# Patient Record
Sex: MALE | HISPANIC OR LATINO | URBAN - METROPOLITAN AREA
[De-identification: names, ages, dates, MRNs, and addresses within clinical notes are randomized per-mention and may not be internally consistent; named-entity substitution may affect disease eponyms.]

---

## 2017-01-24 ENCOUNTER — OFFICE VISIT (OUTPATIENT)
Dept: FAMILY MEDICINE | Facility: CLINIC | Age: 52
End: 2017-01-24
Payer: COMMERCIAL

## 2017-01-24 VITALS
TEMPERATURE: 98.4 F | HEART RATE: 64 BPM | DIASTOLIC BLOOD PRESSURE: 74 MMHG | WEIGHT: 151 LBS | HEIGHT: 64 IN | SYSTOLIC BLOOD PRESSURE: 133 MMHG | BODY MASS INDEX: 25.78 KG/M2

## 2017-01-24 DIAGNOSIS — R07.0 THROAT DISCOMFORT: ICD-10-CM

## 2017-01-24 DIAGNOSIS — J01.90 ACUTE SINUSITIS WITH SYMPTOMS > 10 DAYS: ICD-10-CM

## 2017-01-24 DIAGNOSIS — H69.90 DYSFUNCTION OF EUSTACHIAN TUBE, UNSPECIFIED LATERALITY: Primary | ICD-10-CM

## 2017-01-24 DIAGNOSIS — Z79.4 TYPE 2 DIABETES MELLITUS WITHOUT COMPLICATION, WITH LONG-TERM CURRENT USE OF INSULIN (H): ICD-10-CM

## 2017-01-24 DIAGNOSIS — E11.9 TYPE 2 DIABETES MELLITUS WITHOUT COMPLICATION, WITH LONG-TERM CURRENT USE OF INSULIN (H): ICD-10-CM

## 2017-01-24 PROCEDURE — 99213 OFFICE O/P EST LOW 20 MIN: CPT | Performed by: FAMILY MEDICINE

## 2017-01-24 RX ORDER — PSEUDOEPHEDRINE HCL 120 MG/1
120 TABLET, FILM COATED, EXTENDED RELEASE ORAL EVERY 12 HOURS PRN
Qty: 28 TABLET | Refills: 1 | Status: SHIPPED | OUTPATIENT
Start: 2017-01-24 | End: 2017-12-14

## 2017-01-24 RX ORDER — AMOXICILLIN 500 MG/1
500 CAPSULE ORAL 3 TIMES DAILY
Qty: 42 CAPSULE | Refills: 1 | Status: SHIPPED | OUTPATIENT
Start: 2017-01-24 | End: 2017-02-07

## 2017-01-24 ASSESSMENT — PAIN SCALES - GENERAL: PAINLEVEL: NO PAIN (0)

## 2017-01-24 NOTE — MR AVS SNAPSHOT
After Visit Summary   1/24/2017    Alfred Koenig    MRN: [id number]           Patient Information     Date Of Birth          1965        Visit Information        Provider Department      1/24/2017 9:40 AM Davon Little MD [facility]        Today's Diagnoses     Dysfunction of eustachian tube, unspecified laterality    -  1     Acute sinusitis with symptoms > 10 days         Throat discomfort           Care Instructions    Stay well hydrated    Use the pseudoephedrine as needed, up to 2x per day.  May raise blood pressure some or make for sleep problems    Take the antibiotic amoxicillin    If symptoms not resolving, then see ear nose throat doctor (ENT)            Follow-ups after your visit        Additional Services     OTOLARYNGOLOGY REFERRAL       [place] [facility] [place] [facility] [place] [phone]   [email or web address]    Please be aware that coverage of these services is subject to the terms and limitations of your health insurance plan.  Call member services at your health plan with any benefit or coverage questions.      Please bring the following with you to your appointment:    (1) Any X-Rays, CTs or MRIs which have been performed.  Contact the facility where they were done to arrange for  prior to your scheduled appointment.   (2) List of current medications  (3) This referral request   (4) Any documents/labs given to you for this referral                  Who to contact     If you have questions or need follow up information about today's clinic visit or your schedule please contact [facility] directly at [phone].  Normal or non-critical lab and imaging results will be communicated to you by MyChart, letter or phone within 4 business days after the clinic has received the results. If you do not hear from us within 7 days, please contact the clinic through [employer] or  "phone. If you have a critical or abnormal lab result, we will notify you by phone as soon as possible.  Submit refill requests through [employer] or call your pharmacy and they will forward the refill request to us. Please allow 3 business days for your refill to be completed.          Additional Information About Your Visit        [employer] Information     [employer] lets you send messages to your doctor, view your test results, renew your prescriptions, schedule appointments and more. To sign up, go to [email or web address] . Click on \"Log in\" on the left side of the screen, which will take you to the Welcome page. Then click on \"Sign up Now\" on the right side of the page.     You will be asked to enter the access code listed below, as well as some personal information. Please follow the directions to create your username and password.     Your access code is: [email or web address]  Expires: 3/6/2017  8:06 AM     Your access code will  in 90 days. If you need help or a new code, please call your [facility] clinic or [phone].        Care EveryWhere ID     This is your Care EveryWhere ID. This could be used by other organizations to access your [facility] medical records  [id number]        Your Vitals Were     Pulse Temperature Height BMI (Body Mass Index)          64 98.4  F (36.9  C) (Oral) 5' 4\" (1.626 m) 25.91 kg/m2         Blood Pressure from Last 3 Encounters:   17 133/74   16 130/72   16 125/78    Weight from Last 3 Encounters:   17 151 lb (68.493 kg)   16 148 lb (67.132 kg)   16 148 lb (67.132 kg)              We Performed the Following     OTOLARYNGOLOGY REFERRAL          Today's Medication Changes          These changes are accurate as of: 17 10:05 AM.  If you have any questions, ask your nurse or doctor.               Start taking these medicines.        Dose/Directions    amoxicillin 500 MG capsule   Commonly known as:  AMOXIL   Used for:  Acute sinusitis with symptoms " > 10 days   Started by:  Davon Little MD        Dose:  500 mg   Take 1 capsule (500 mg) by mouth 3 times daily for 14 days   Quantity:  42 capsule   Refills:  1       pseudoePHEDrine 120 MG 12 hr tablet   Commonly known as:  SUDAFED   Used for:  Dysfunction of eustachian tube, unspecified laterality   Started by:  Davon Little MD        Dose:  120 mg   Take 1 tablet (120 mg) by mouth every 12 hours as needed for congestion   Quantity:  28 tablet   Refills:  1            Where to get your medicines      Some of these will need a paper prescription and others can be bought over the counter.  Ask your nurse if you have questions.     Bring a paper prescription for each of these medications    - amoxicillin 500 MG capsule  - pseudoePHEDrine 120 MG 12 hr tablet             Primary Care Provider Office Phone # Fax #    Davon Little [phone]       [facility] [address] [facility] [address]        Thank you!     Thank you for choosing [facility]  for your care. Our goal is always to provide you with excellent care. Hearing back from our patients is one way we can continue to improve our services. Please take a few minutes to complete the written survey that you may receive in the mail after your visit with us. Thank you!             Your Updated Medication List - Protect others around you: Learn how to safely use, store and throw away your medicines at www.disposemymeds.org.          This list is accurate as of: 1/24/17 10:05 AM.  Always use your most recent med list.                   Brand Name Dispense Instructions for use    ACE/ARB NOT PRESCRIBED (INTENTIONAL)      1 each continuous prn. ACE & ARB not prescribed due to Other: blood pressure fine, no microalbuminuria       amoxicillin 500 MG capsule    AMOXIL    42 capsule    Take 1 capsule (500 mg) by mouth 3 times daily for 14 days       aspirin 81 MG tablet     100 Tab    ONE DAILY        blood glucose monitoring lancets     1 Box    1 each 3 times daily       blood glucose monitoring meter device kit    no brand specified    1 kit    1 Device 3 times daily.       * blood glucose monitoring test strip    no brand specified    3 Month    Use to test blood sugar 3 times daily (Contour NEXT Test Strips).       * blood glucose monitoring test strip    SHRADDHA CONTOUR NEXT    100 each    Please note needs Contour NEXT  test stripsby In Vitro route 3 times daily. Please note needs Contour NEXT  test strips       glipiZIDE 10 MG tablet    GLUCOTROL    180 tablet    Take 1 tablet (10 mg) by mouth 2 times daily (before meals)       insulin glargine 100 UNIT/ML injection    LANTUS    3 Month    50 units at bedtime       insulin pen needle 31G X 8 MM     100 each    1 Device daily       metFORMIN 1000 MG tablet    GLUCOPHAGE    180 tablet    Take 1 tablet (1,000 mg) by mouth 2 times daily (with meals)       pseudoePHEDrine 120 MG 12 hr tablet    SUDAFED    28 tablet    Take 1 tablet (120 mg) by mouth every 12 hours as needed for congestion       rosuvastatin 40 MG tablet    CRESTOR    90 tablet    Take 1 tablet (40 mg) by mouth daily       sildenafil 100 MG cap/tab    VIAGRA    6 tablet    Take 0.5-1 tablets ( mg) by mouth daily as needed for erectile dysfunction Take 30 min to 4 hours before intercourse.  Never use with nitroglycerin, terazosin or doxazosin.       * Notice:  This list has 2 medication(s) that are the same as other medications prescribed for you. Read the directions carefully, and ask your doctor or other care provider to review them with you.

## 2017-01-24 NOTE — PROGRESS NOTES
SUBJECTIVE:                                                    Alfred Koenig is a 51 year old male who presents to clinic today for the following health issues:       Throat problems on and off for the last 2 months feels like something is stuck in there  Right ear problem also         Problem list and histories reviewed & adjusted, as indicated.  Additional history: as documented             HPI      ROS  No pain    Happens several times per day; variable    Eating and drinking okay    Nothing gets stuck     No acid taste    Ear bothering also    Also a couple months     Overall no change    No fever/ chills    Fine in am when waking up    Feels some drainage down back of throat      Physical Exam   Constitutional: He is oriented to person, place, and time and well-developed, well-nourished, and in no distress.   HENT:   Head: Normocephalic and atraumatic.   Right Ear: External ear normal.   Left Ear: External ear normal.   Nose: Nose normal.   Mouth/Throat: Oropharynx is clear and moist.   No sinus/ submandib tenderness   Eyes: Conjunctivae are normal.   Neck: Neck supple.   Cardiovascular: Normal rate, regular rhythm and normal heart sounds.    Pulmonary/Chest: Effort normal and breath sounds normal. He exhibits no tenderness.   Abdominal: Soft. Bowel sounds are normal. There is no tenderness.   No back or costovertebral angle tenderness     Neurological: He is alert and oriented to person, place, and time.       ASSESSMENT / PLAN:  (H69.80) Dysfunction of eustachian tube, unspecified laterality  (primary encounter diagnosis)  Comment: prudent to try sudafed prn.  Warned of side effects.   Plan: pseudoePHEDrine (SUDAFED) 120 MG 12 hr tablet             (J01.90) Acute sinusitis with symptoms > 10 days  Comment: gave prescription to treat for smoldering posterior sinusitis  Plan: amoxicillin (AMOXIL) 500 MG capsule             (R07.0) Throat discomfort  Comment: if patient goes through the above, and symptoms  not better, then see ENT  Plan: OTOLARYNGOLOGY REFERRAL        Patient agreed with plan     (E11.9,  Z79.4) Type 2 diabetes mellitus without complication, with long-term current use of insulin (H)  Comment: patient also needs new meter   Plan: blood glucose monitoring (NO BRAND SPECIFIED)         meter device kit        Sent in       I reviewed the patient's medications, allergies, medical history, family history, and social history.    Davon Little MD

## 2017-01-24 NOTE — PATIENT INSTRUCTIONS
Stay well hydrated    Use the pseudoephedrine as needed, up to 2x per day.  May raise blood pressure some or make for sleep problems    Take the antibiotic amoxicillin    If symptoms not resolving, then see ear nose throat doctor (ENT)

## 2017-01-24 NOTE — NURSING NOTE
"Chief Complaint   Patient presents with     Throat Problem       Initial /74 mmHg  Pulse 64  Temp(Src) 98.4  F (36.9  C) (Oral)  Ht 5' 4\" (1.626 m)  Wt 151 lb (68.493 kg)  BMI 25.91 kg/m2 Estimated body mass index is 25.91 kg/(m^2) as calculated from the following:    Height as of this encounter: 5' 4\" (1.626 m).    Weight as of this encounter: 151 lb (68.493 kg).  BP completed using cuff size: regular taken on the right arm  Erica Broderick CMA      "

## 2017-02-13 DIAGNOSIS — Z79.4 TYPE 2 DIABETES MELLITUS WITHOUT COMPLICATION, WITH LONG-TERM CURRENT USE OF INSULIN (H): ICD-10-CM

## 2017-02-13 DIAGNOSIS — E11.9 TYPE 2 DIABETES MELLITUS WITHOUT COMPLICATION, WITH LONG-TERM CURRENT USE OF INSULIN (H): ICD-10-CM

## 2017-02-13 NOTE — TELEPHONE ENCOUNTER
glipiZIDE (GLUCOTROL) 10 MG tablet         Last Written Prescription Date: 12-6-16  Last Fill Quantity: 180, # refills: 1  Last Office Visit with [facility], [facility] or [facility] prescribing provider:  1-24-17        BP Readings from Last 3 Encounters:   01/24/17 133/74   12/06/16 130/72   03/23/16 125/78     Lab Results   Component Value Date    MICROL <5 03/23/2016     No results found for: MICROALBUMIN  Creatinine   Date Value Ref Range Status   12/06/2016 0.83 0.66 - 1.25 mg/dL Final   ]  GFR Estimate   Date Value Ref Range Status   12/06/2016 >90  Non  GFR Calc   >60 mL/min/1.7m2 Final   03/23/2016 >90  Non  GFR Calc   >60 mL/min/1.7m2 Final   04/14/2015 >90  Non  GFR Calc   >60 mL/min/1.7m2 Final     GFR Estimate If Black   Date Value Ref Range Status   12/06/2016 >90   GFR Calc   >60 mL/min/1.7m2 Final   03/23/2016 >90   GFR Calc   >60 mL/min/1.7m2 Final   04/14/2015 >90   GFR Calc   >60 mL/min/1.7m2 Final     Lab Results   Component Value Date    CHOL 191 12/06/2016     Lab Results   Component Value Date    HDL 58 12/06/2016     Lab Results   Component Value Date     12/06/2016     Lab Results   Component Value Date    TRIG 103 12/06/2016     Lab Results   Component Value Date    CHOLHDLRATIO 3.5 04/14/2015     Lab Results   Component Value Date    AST 17 12/06/2016     Lab Results   Component Value Date    ALT 24 12/06/2016     Lab Results   Component Value Date    A1C 8.3 12/06/2016    A1C 7.9 03/23/2016    A1C 8.1 04/14/2015    A1C 7.7 04/09/2014    A1C 8.2 12/18/2013     Potassium   Date Value Ref Range Status   12/06/2016 4.4 3.4 - 5.3 mmol/L Final

## 2017-02-14 RX ORDER — GLIPIZIDE 10 MG/1
10 TABLET ORAL
Qty: 180 TABLET | Refills: 1 | Status: SHIPPED | OUTPATIENT
Start: 2017-02-14 | End: 2017-12-14

## 2017-02-22 ENCOUNTER — TELEPHONE (OUTPATIENT)
Dept: FAMILY MEDICINE | Facility: CLINIC | Age: 52
End: 2017-02-22

## 2017-02-22 NOTE — TELEPHONE ENCOUNTER
Patient on fail list for diabetes.  Should be seen in clinic in the next month or so for clinic visit.    Please inform patient.    Davon Little MD

## 2017-02-28 ENCOUNTER — OFFICE VISIT (OUTPATIENT)
Dept: OTOLARYNGOLOGY | Facility: CLINIC | Age: 52
End: 2017-02-28
Payer: COMMERCIAL

## 2017-02-28 DIAGNOSIS — K21.9 LPRD (LARYNGOPHARYNGEAL REFLUX DISEASE): ICD-10-CM

## 2017-02-28 DIAGNOSIS — R09.A2 GLOBUS SENSATION: Primary | ICD-10-CM

## 2017-02-28 PROCEDURE — 99244 OFF/OP CNSLTJ NEW/EST MOD 40: CPT | Mod: 25 | Performed by: OTOLARYNGOLOGY

## 2017-02-28 PROCEDURE — 31575 DIAGNOSTIC LARYNGOSCOPY: CPT | Performed by: OTOLARYNGOLOGY

## 2017-02-28 NOTE — PROGRESS NOTES
I am seeing this patient in consultation for globus at the request of the provider Dr. Little.    Chief Complaint - foreign body sensation in throat    History of Present Illness - Alfred Koenig is a 51 year old male who presents with a history of feeling like something is in the back of the throat since 3 months. It is intermittent. No pain. Voicing has seemed normal. The patient denies any recent intubations or throat procedures. They note some history of relux. Sometimes takes tums. No cough, hemoptysis, odynaphagia. No dysphagia with solids. Tried antibiotics, but no help. Non smoker.     Past Medical History -   Patient Active Problem List   Diagnosis     Hyperlipidemia LDL goal <100     Type 2 diabetes, HbA1C goal < 8% (H)     Erectile dysfunction, unspecified erectile dysfunction type     Type 2 diabetes mellitus without complication, with long-term current use of insulin (H)       Current Medications -   Current Outpatient Prescriptions:      glipiZIDE (GLUCOTROL) 10 MG tablet, Take 1 tablet (10 mg) by mouth 2 times daily (before meals), Disp: 180 tablet, Rfl: 1     pseudoePHEDrine (SUDAFED) 120 MG 12 hr tablet, Take 1 tablet (120 mg) by mouth every 12 hours as needed for congestion, Disp: 28 tablet, Rfl: 1     blood glucose monitoring (NO BRAND SPECIFIED) meter device kit, Use to test blood sugar 3 times daily or as directed.  Needs new meter and then also include all  Needed strips, lancets, and other supplies, 3 months worth, refills for a year, Disp: 1 kit, Rfl: 11     sildenafil (VIAGRA) 100 MG cap/tab, Take 0.5-1 tablets ( mg) by mouth daily as needed for erectile dysfunction Take 30 min to 4 hours before intercourse.  Never use with nitroglycerin, terazosin or doxazosin., Disp: 6 tablet, Rfl: 1     metFORMIN (GLUCOPHAGE) 1000 MG tablet, Take 1 tablet (1,000 mg) by mouth 2 times daily (with meals), Disp: 180 tablet, Rfl: 1     insulin glargine (LANTUS) 100 UNIT/ML injection, 50 units at bedtime,  Disp: 3 Month, Rfl: 1     blood glucose monitoring (SHRADDHA MICROLET) lancets, 1 each 3 times daily, Disp: 1 Box, Rfl: 11     blood glucose monitoring (SHRADDHA CONTOUR NEXT) test strip, Please note needs Contour NEXT  test stripsby In Vitro route 3 times daily. Please note needs Contour NEXT  test strips, Disp: 100 each, Rfl: 11     rosuvastatin (CRESTOR) 40 MG tablet, Take 1 tablet (40 mg) by mouth daily, Disp: 90 tablet, Rfl: 3     blood glucose monitoring (NO BRAND SPECIFIED) test strip, Use to test blood sugar 3 times daily (Contour NEXT Test Strips)., Disp: 3 Month, Rfl: 3     insulin pen needle 31G X 8 MM, 1 Device daily, Disp: 100 each, Rfl: 3     ACE/ARB NOT PRESCRIBED, INTENTIONAL,, 1 each continuous prn. ACE & ARB not prescribed due to Other: blood pressure fine, no microalbuminuria   , Disp: , Rfl:      ASPIRIN 81 MG PO TABS, ONE DAILY, Disp: 100 Tab, Rfl: 3    Allergies - No Known Allergies    Social History -   Social History     Social History     Marital status:      Spouse name: N/A     Number of children: 5     Years of education: N/A     Occupational History      [employer]     Social History Main Topics     Smoking status: Never Smoker     Smokeless tobacco: Never Used     Alcohol use 0.0 oz/week     0 Standard drinks or equivalent per week      Comment: 6 beers per week     Drug use: No     Sexual activity: Yes     Partners: Female     Other Topics Concern     Parent/Sibling W/ Cabg, Mi Or Angioplasty Before 65f 55m? No     Social History Narrative       Family History -   Family History   Problem Relation Age of Onset     DIABETES Mother      DIABETES Sister      DIABETES Brother      DIABETES Son      C.A.D. No family hx of      CANCER No family hx of      Hypertension No family hx of      CEREBROVASCULAR DISEASE No family hx of      Hearing Loss No family hx of      Thyroid Disease No family hx of      Glaucoma No family hx of      Macular Degeneration No family hx of         Review of Systems - As per HPI and PMHx, notes some allergies and postnasal drainage that is mild, otherwise 10+ comprehensive system review is negative.    Physical Exam  General - The patient is in no distress. Alert and oriented to person and place, answers questions and cooperates with examination appropriately.   Voice and Breathing - The patient was breathing comfortably without the use of accessory muscles. There was no wheezing, stridor, or stertor.  The patients voice was clear and strong.  Eyes - Extraocular movements intact.  Sclera were not icteric or injected, conjunctiva were pink and moist.  Mouth - Examination of the oral cavity showed pink, healthy oral mucosa. No lesions or ulcerations noted.  The tongue was mobile and midline.  Throat - The walls of the oropharynx were smooth, symmetric, and had no lesions or ulcerations.  The tonsillar pillars and soft palate were symmetric.  The uvula was midline on elevation.   Nose - External contour is symmetric, no gross deflection or scars.  Nasal mucosa is pink and moist with no abnormal mucus.  The septum was midline and non-obstructive, turbinates of normal size and position.  No polyps, masses, or purulence noted on examination.  Neck -  Palpation of the occipital, submental, submandibular, internal jugular chain, and supraclavicular nodes did not demonstrate any abnormal lymph nodes or masses. No parotid masses. Palpation of the thyroid was soft and smooth, with no nodules or goiter appreciated.  The trachea was mobile and midline.  Neurologic - CN II-XII are grossly intact, no focal neurologic deficits.   Cardiovascular - carotid pulses are 2+ bilaterally, regular rhythm    Procedure: Flexible Endoscopy  Indication: Globus Sensation    To best visualize the upper airway anatomy and due to the chief complaint and HPI, I proceeded with a fiberoptic examination. Color photographs were taken for the medical record. First I sprayed the right side of  the nose with a mixture of lidocaine and neosynephrine.  I then passed the scope through the nasal cavity.  The nasal cavity was unremarkable.  The nasopharynx was mucosally covered and symmetric.  The Eustachian tube openings were unobstructed.  Going further down I had a clear view of the base of tongue which had normal appearing lingual tonsillar tissue.  The base of tongue was free of lesions, masses, and the vallecula was open.  The epiglottis was smooth and mucosally covered.  The supraglottic larynx was then clearly visualized. It appeared normal. Maybe mild inflammation of the arytenoids. The vocal cords moved smoothly and symmetrically, they were pearly white and no lesions were seen.  The pyriform sinuses were open, and the limited view of the postcricoid region did not show any lesions.      BONITA/FRANCISCO Koenig is a 51 year old male with globus sensation but no evidence of infection, inflammation, or neoplasm on exam to explain the symptoms. I think the most likely etiology is laryngopharyngeal reflux. I have advised trying pepcid, and provided counseling on lifestyle changes including avoidance of certain foods, sleeping on an incline, and avoiding lying down within 3-4 hours after eating. Recheck in 4-6 weeks or if things worsen.     Adult lifestyle changes to prevent LPR reviewed      Avoid eating and drinking within two to three hours prior to bedtime    Do not drink alcohol    Eat small meals and slowly    Limit problem foods:    o Caffeine  o Carbonated drinks  o Chocolate  o Peppermint  o Tomato  o Citrus fruits  o Fatty and fried foods      Lose weight    Quit smoking    Wear loose clothing      Dr. Johnathon Marcelino  Otolaryngology  [facility]

## 2017-02-28 NOTE — PATIENT INSTRUCTIONS
General Scheduling Information  To schedule your CT/MRI scan, please contact Tristen Moore at [phone]   [address] [place] [address]    To schedule your Surgery, please contact our Specialty Schedulers at [phone]    ENT Clinic Locations Clinic Hours Telephone Number     [facility]ieg[facility]  [address] [place] [address]   Tuesday:       8:00am -- 4:00pm    Wednesday:  8:00am - 4:00pm   To schedule an appointment with   Dr. Marcelino,   please contact our   Specialty Scheduling Department at:     [phone]       [facility]  [address] [place] [address]   Friday:          8:00am - 4:00pm         Urgent Care Locations Clinic Hours Telephone Numbers     [facility]  [address] [place] [address]     Monday-Friday:     11:00pm - 9:00pm    Saturday-Sunday:  9:00am - 5:00pm   [phone]     [facility]  [address] [place] [address]     Monday-Friday:      5:00pm - 9:00pm     Saturday-Sunday:  9:00am - 5:00pm   [phone]

## 2017-02-28 NOTE — MR AVS SNAPSHOT
After Visit Summary   2/28/2017    Alfred Koenig    MRN: [id number]           Patient Information     Date Of Birth          1965        Visit Information        Provider Department      2/28/2017 9:00 AM Johnathon Marcelino MD [facility] [place]        Today's Diagnoses     Globus sensation    -  1    LPRD (laryngopharyngeal reflux disease)          Care Instructions    General Scheduling Information  To schedule your CT/MRI scan, please contact Tristen Moore at [phone] [address] [place] [address]    To schedule your Surgery, please contact our Specialty Schedulers at [phone]    ENT Clinic Locations Clinic Hours Telephone Number     Mandi [facility]  [address]  [place] [address]   Tuesday:       8:00am -- 4:00pm    Wednesday:  8:00am - 4:00pm   To schedule an appointment with   Dr. Marcelino,   please contact our   Specialty Scheduling Department at:     [phone]       Mandi [facility]  [address]   [place] [address]   Friday:          8:00am - 4:00pm         Urgent Care Locations Clinic Hours Telephone Numbers     [facility]andi [facility] [place]  [address] [place] [address]     Monday-Friday:     11:00pm - 9:00pm    Saturday-Sunday:  9:00am - 5:00pm   [phone]     Mandi Hampton  [address]   [place] [address]     Monday-Friday:      5:00pm - 9:00pm     Saturday-Sunday:  9:00am - 5:00pm   [phone]             Follow-ups after your visit        Your next 10 appointments already scheduled     Mar 28, 2017 10:30 AM CDT   New Visit with Ailyn Pruitt OD   [facility] [place] ([facility])    [address]  [place] [address] [phone]              Who to contact     If you have questions or need follow up information about today's clinic visit or your schedule please contact [facility] directly at [phone].  Normal or non-critical lab and imaging results will  "be communicated to you by MyChart, letter or phone within 4 business days after the clinic has received the results. If you do not hear from us within 7 days, please contact the clinic through [employer] or phone. If you have a critical or abnormal lab result, we will notify you by phone as soon as possible.  Submit refill requests through [employer] or call your pharmacy and they will forward the refill request to us. Please allow 3 business days for your refill to be completed.          Additional Information About Your Visit        [employer] Information     [employer] lets you send messages to your doctor, view your test results, renew your prescriptions, schedule appointments and more. To sign up, go to [email or web address] . Click on \"Log in\" on the left side of the screen, which will take you to the Welcome page. Then click on \"Sign up Now\" on the right side of the page.     You will be asked to enter the access code listed below, as well as some personal information. Please follow the directions to create your username and password.     Your access code is: [email or web address]  Expires: 3/6/2017  8:06 AM     Your access code will  in 90 days. If you need help or a new code, please call your [facility] clinic or [phone].        Care EveryWhere ID     This is your Care EveryWhere ID. This could be used by other organizations to access your [facility] medical records  [id number]         Blood Pressure from Last 3 Encounters:   17 133/74   16 130/72   16 125/78    Weight from Last 3 Encounters:   17 68.5 kg (151 lb)   16 67.1 kg (148 lb)   16 67.1 kg (148 lb)              We Performed the Following     Laryngoscopy, Fiber        Primary Care Provider Office Phone # Fax #    Davon Little [phone]       [address] [address]        Thank you!     Thank you for choosing [facility] [place]  for your care. " Our goal is always to provide you with excellent care. Hearing back from our patients is one way we can continue to improve our services. Please take a few minutes to complete the written survey that you may receive in the mail after your visit with us. Thank you!             Your Updated Medication List - Protect others around you: Learn how to safely use, store and throw away your medicines at www.disposemymeds.org.          This list is accurate as of: 2/28/17  9:23 AM.  Always use your most recent med list.                   Brand Name Dispense Instructions for use    ACE/ARB NOT PRESCRIBED (INTENTIONAL)      1 each continuous prn. ACE & ARB not prescribed due to Other: blood pressure fine, no microalbuminuria       aspirin 81 MG tablet     100 Tab    ONE DAILY       blood glucose monitoring lancets     1 Box    1 each 3 times daily       blood glucose monitoring meter device kit    no brand specified    1 kit    Use to test blood sugar 3 times daily or as directed.  Needs new meter and then also include all  Needed strips, lancets, and other supplies, 3 months worth, refills for a year       * blood glucose monitoring test strip    no brand specified    3 Month    Use to test blood sugar 3 times daily (Contour NEXT Test Strips).       * blood glucose monitoring test strip    SHRADDHA CONTOUR NEXT    100 each    Please note needs Contour NEXT  test stripsby In Vitro route 3 times daily. Please note needs Contour NEXT  test strips       glipiZIDE 10 MG tablet    GLUCOTROL    180 tablet    Take 1 tablet (10 mg) by mouth 2 times daily (before meals)       insulin glargine 100 UNIT/ML injection    LANTUS    3 Month    50 units at bedtime       insulin pen needle 31G X 8 MM     100 each    1 Device daily       metFORMIN 1000 MG tablet    GLUCOPHAGE    180 tablet    Take 1 tablet (1,000 mg) by mouth 2 times daily (with meals)       pseudoePHEDrine 120 MG 12 hr tablet    SUDAFED    28 tablet    Take 1 tablet (120 mg) by  mouth every 12 hours as needed for congestion       rosuvastatin 40 MG tablet    CRESTOR    90 tablet    Take 1 tablet (40 mg) by mouth daily       sildenafil 100 MG cap/tab    VIAGRA    6 tablet    Take 0.5-1 tablets ( mg) by mouth daily as needed for erectile dysfunction Take 30 min to 4 hours before intercourse.  Never use with nitroglycerin, terazosin or doxazosin.       * Notice:  This list has 2 medication(s) that are the same as other medications prescribed for you. Read the directions carefully, and ask your doctor or other care provider to review them with you.

## 2017-03-12 DIAGNOSIS — E78.5 HYPERLIPIDEMIA LDL GOAL <100: ICD-10-CM

## 2017-03-13 RX ORDER — ROSUVASTATIN CALCIUM 40 MG/1
TABLET, COATED ORAL
Qty: 90 TABLET | Refills: 2 | Status: SHIPPED | OUTPATIENT
Start: 2017-03-13 | End: 2017-12-14

## 2017-03-13 NOTE — TELEPHONE ENCOUNTER
rosuvastatin (CRESTOR) 40 MG tablet     Last Written Prescription Date: 3/23/16  Last Fill Quantity: 90, # refills: 3  Last Office Visit with G, P or [facility] prescribing provider: 1/24/17       Lab Results   Component Value Date    CHOL 191 12/06/2016     Lab Results   Component Value Date    HDL 58 12/06/2016     Lab Results   Component Value Date     12/06/2016     Lab Results   Component Value Date    TRIG 103 12/06/2016     Lab Results   Component Value Date    CHOLHDLRATIO 3.5 04/14/2015

## 2017-03-13 NOTE — TELEPHONE ENCOUNTER
Prescription approved per [facility] Refill Protocol.  Janet Dailey RN  [facility]

## 2017-03-28 ENCOUNTER — OFFICE VISIT (OUTPATIENT)
Dept: OPTOMETRY | Facility: CLINIC | Age: 52
End: 2017-03-28
Payer: COMMERCIAL

## 2017-03-28 DIAGNOSIS — E11.3299 TYPE 2 DIABETES MELLITUS WITH BACKGROUND RETINOPATHY WITHOUT MACULAR EDEMA (H): Primary | ICD-10-CM

## 2017-03-28 DIAGNOSIS — H52.4 PRESBYOPIA: ICD-10-CM

## 2017-03-28 DIAGNOSIS — H52.11 MYOPIA OF RIGHT EYE WITH ASTIGMATISM: ICD-10-CM

## 2017-03-28 DIAGNOSIS — H52.201 MYOPIA OF RIGHT EYE WITH ASTIGMATISM: ICD-10-CM

## 2017-03-28 PROCEDURE — 92014 COMPRE OPH EXAM EST PT 1/>: CPT | Performed by: OPTOMETRIST

## 2017-03-28 PROCEDURE — 92015 DETERMINE REFRACTIVE STATE: CPT | Performed by: OPTOMETRIST

## 2017-03-28 ASSESSMENT — VISUAL ACUITY
OD_SC: 20/120
OS_SC+: -1
METHOD: SNELLEN - LINEAR
OS_SC: 20/20
OS_SC: 20/120
OD_SC: 20/20

## 2017-03-28 ASSESSMENT — REFRACTION_WEARINGRX
OD_ADD: +1.75
OD_CYLINDER: +0.75
OS_CYLINDER: SPHERE
OS_SPHERE: +2.25
OS_SPHERE: +0.50
OD_CYLINDER: +0.75
OD_AXIS: 080
OD_AXIS: 080
OS_CYLINDER: SPHERE
OS_ADD: +1.75
OD_SPHERE: +1.50
OD_SPHERE: -0.25

## 2017-03-28 ASSESSMENT — CUP TO DISC RATIO
OD_RATIO: 0.3
OS_RATIO: 0.2

## 2017-03-28 ASSESSMENT — REFRACTION_MANIFEST
OD_ADD: +2.00
OD_SPHERE: -0.25
OS_CYLINDER: SPHERE
OD_CYLINDER: +0.50
OS_SPHERE: PLANO
OD_AXIS: 080
OS_ADD: +2.00

## 2017-03-28 ASSESSMENT — CONF VISUAL FIELD
OD_NORMAL: 1
OS_NORMAL: 1

## 2017-03-28 ASSESSMENT — TONOMETRY
OD_IOP_MMHG: 17
IOP_METHOD: APPLANATION
OS_IOP_MMHG: 17

## 2017-03-28 ASSESSMENT — EXTERNAL EXAM - RIGHT EYE: OD_EXAM: NORMAL

## 2017-03-28 ASSESSMENT — SLIT LAMP EXAM - LIDS
COMMENTS: NORMAL
COMMENTS: NORMAL

## 2017-03-28 ASSESSMENT — EXTERNAL EXAM - LEFT EYE: OS_EXAM: NORMAL

## 2017-03-28 NOTE — MR AVS SNAPSHOT
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  Allow time for adaptation.  The glasses may cause dizziness and affect depth perception for awhile.  Return to clinic 1 year for Comprehensive Vision Exam      Ailyn Pruitt O.D  [facility]  [address] [place] [address]. [place]  [address]    [phone]              Follow-ups after your visit        Follow-up notes from your care team     Return in about 1 year (around 3/28/2018) for Eye Exam.      Who to contact     If you have questions or need follow up information about today's clinic visit or your schedule please contact [facility] directly at [phone].  Normal or non-critical lab and imaging results will be communicated to you by MyChart, letter or phone within 4 business days after the clinic has received the results. If you do not hear from us within 7 days, please contact the clinic through [employer] or phone. If you have a critical or abnormal lab result, we will notify you by phone as soon as possible.  Submit refill requests through [employer] or call your pharmacy and they will forward the refill request to us. Please allow 3 business days for your refill to be completed.          Additional Information About Your Visit        MyChart Information     [employer] lets you send messages to your doctor, view your test results, renew your prescriptions, schedule appointments  "and more. To sign up, go to [email or web address] . Click on \"Log in\" on the left side of the screen, which will take you to the Welcome page. Then click on \"Sign up Now\" on the right side of the page.     You will be asked to enter the access code listed below, as well as some personal information. Please follow the directions to create your username and password.     Your access code is: [email or web address]  Expires: 2017 11:23 AM     Your access code will  in 90 days. If you need help or a new code, please call your [facility] clinic or [phone].        Care EveryWhere ID     This is your Care EveryWhere ID. This could be used by other organizations to access your [facility] medical records  [id number]         Blood Pressure from Last 3 Encounters:   17 133/74   16 130/72   16 125/78    Weight from Last 3 Encounters:   17 68.5 kg (151 lb)   16 67.1 kg (148 lb)   16 67.1 kg (148 lb)              We Performed the Following     EYE EXAM (SIMPLE-NONBILLABLE)     REFRACTION        Primary Care Provider Office Phone # Fax #    Davon Little [phone]       [address] [address]        Thank you!     Thank you for choosing [facility] [place]  for your care. Our goal is always to provide you with excellent care. Hearing back from our patients is one way we can continue to improve our services. Please take a few minutes to complete the written survey that you may receive in the mail after your visit with us. Thank you!             Your Updated Medication List - Protect others around you: Learn how to safely use, store and throw away your medicines at www.disposemymeds.org.          This list is accurate as of: 3/28/17 11:23 AM.  Always use your most recent med list.                   Brand Name Dispense Instructions for use    ACE/ARB NOT PRESCRIBED (INTENTIONAL)      1 each continuous prn. ACE & ARB " not prescribed due to Other: blood pressure fine, no microalbuminuria       aspirin 81 MG tablet     100 Tab    ONE DAILY       blood glucose monitoring lancets     1 Box    1 each 3 times daily       blood glucose monitoring meter device kit    no brand specified    1 kit    Use to test blood sugar 3 times daily or as directed.  Needs new meter and then also include all  Needed strips, lancets, and other supplies, 3 months worth, refills for a year       * blood glucose monitoring test strip    no brand specified    3 Month    Use to test blood sugar 3 times daily (Contour NEXT Test Strips).       * blood glucose monitoring test strip    SHRADDHA CONTOUR NEXT    100 each    Please note needs Contour NEXT  test stripsby In Vitro route 3 times daily. Please note needs Contour NEXT  test strips       glipiZIDE 10 MG tablet    GLUCOTROL    180 tablet    Take 1 tablet (10 mg) by mouth 2 times daily (before meals)       insulin glargine 100 UNIT/ML injection    LANTUS    3 Month    50 units at bedtime       insulin pen needle 31G X 8 MM     100 each    1 Device daily       metFORMIN 1000 MG tablet    GLUCOPHAGE    180 tablet    Take 1 tablet (1,000 mg) by mouth 2 times daily (with meals)       pseudoePHEDrine 120 MG 12 hr tablet    SUDAFED    28 tablet    Take 1 tablet (120 mg) by mouth every 12 hours as needed for congestion       rosuvastatin 40 MG tablet    CRESTOR    90 tablet    TAKE 1 TABLET (40 MG) BY MOUTH DAILY       sildenafil 100 MG cap/tab    VIAGRA    6 tablet    Take 0.5-1 tablets ( mg) by mouth daily as needed for erectile dysfunction Take 30 min to 4 hours before intercourse.  Never use with nitroglycerin, terazosin or doxazosin.       * Notice:  This list has 2 medication(s) that are the same as other medications prescribed for you. Read the directions carefully, and ask your doctor or other care provider to review them with you.

## 2017-03-28 NOTE — PATIENT INSTRUCTIONS
Monitor, Keep blood sugar under control  Sent letter to primary care provider regarding diabetes  A final glasses prescription was given.  Allow time for adaptation.  The glasses may cause dizziness and affect depth perception for awhile.  Return to clinic 1 year for Comprehensive Vision Exam      Ailyn Pruitt O.D  [address] [place]  [address]    [phone]

## 2017-03-28 NOTE — LETTER
[facility]  Optometry Department      3/28/2017    Re:  Alfred Koenig  1965   [id number]    Dear Dr. Little,    Your patient was seen in our office on 3/28/2017 for a dilated diabetic eye exam.      Findings:    Background Diabetic Retinopathy -  1 Microaneurysm, dot hemorrhages, Control of HTN and glucose is essential  OU - Both    Comments:  Alfred should return for a comprehensive eye exam in 1 year.    Sincerely,        Ailyn Pruitt O.D  [address] [place]  [address]    [phone]

## 2017-03-28 NOTE — PROGRESS NOTES
Chief Complaint   Patient presents with     Diabetic Eye Exam     Accompanied by self  Lab Results   Component Value Date    A1C 8.3 12/06/2016    A1C 7.9 03/23/2016    A1C 8.1 04/14/2015    A1C 7.7 04/09/2014    A1C 8.2 12/18/2013       Last Eye Exam: 2 years ago  Dilated Previously: Yes    What are you currently using to see?  does not use glasses or contacts    Distance Vision Acuity: Satisfied with vision    Near Vision Acuity: Not satisfied     Eye Comfort: good  Do you use eye drops? : No  Occupation or Hobbies: [employer]     Medical, surgical and family histories reviewed and updated 3/28/2017.       OBJECTIVE: See Ophthalmology exam    ASSESSMENT:    ICD-10-CM    1. Type 2 diabetes mellitus with background retinopathy without macular edema E11.3299 EYE EXAM (SIMPLE-NONBILLABLE)   2. Presbyopia H52.4 EYE EXAM (SIMPLE-NONBILLABLE)     REFRACTION   3. Myopia of right eye with astigmatism H52.11 EYE EXAM (SIMPLE-NONBILLABLE)    H52.201 REFRACTION      PLAN:  Monitor, Keep blood sugar under control  Sent letter to primary care provider regarding diabetes.  Alfred Koenig aware  eye exam results will be sent to Davon Little  A final glasses prescription was given.  Allow time for adaptation.  The glasses may cause dizziness and affect depth perception for awhile.  Return to clinic 1 year for Comprehensive Vision Exam      Ailyn Pruitt O.D  [address]  [place]  [address]    [phone]

## 2017-04-10 ENCOUNTER — OFFICE VISIT (OUTPATIENT)
Dept: FAMILY MEDICINE | Facility: CLINIC | Age: 52
End: 2017-04-10
Payer: COMMERCIAL

## 2017-04-10 VITALS
WEIGHT: 151 LBS | TEMPERATURE: 97.4 F | SYSTOLIC BLOOD PRESSURE: 123 MMHG | BODY MASS INDEX: 25.92 KG/M2 | DIASTOLIC BLOOD PRESSURE: 75 MMHG | HEART RATE: 65 BPM

## 2017-04-10 DIAGNOSIS — E78.5 HYPERLIPIDEMIA LDL GOAL <100: ICD-10-CM

## 2017-04-10 DIAGNOSIS — Z79.4 TYPE 2 DIABETES MELLITUS WITH RETINOPATHY WITHOUT MACULAR EDEMA, WITH LONG-TERM CURRENT USE OF INSULIN, UNSPECIFIED LATERALITY, UNSPECIFIED RETINOPATHY SEVERITY (H): Primary | ICD-10-CM

## 2017-04-10 DIAGNOSIS — Z12.11 SCREEN FOR COLON CANCER: ICD-10-CM

## 2017-04-10 DIAGNOSIS — N52.9 ERECTILE DYSFUNCTION, UNSPECIFIED ERECTILE DYSFUNCTION TYPE: ICD-10-CM

## 2017-04-10 DIAGNOSIS — E11.319 TYPE 2 DIABETES MELLITUS WITH RETINOPATHY WITHOUT MACULAR EDEMA, WITH LONG-TERM CURRENT USE OF INSULIN, UNSPECIFIED LATERALITY, UNSPECIFIED RETINOPATHY SEVERITY (H): Primary | ICD-10-CM

## 2017-04-10 LAB
CHOLEST SERPL-MCNC: 139 MG/DL
CREAT UR-MCNC: 58 MG/DL
HBA1C MFR BLD: 8 % (ref 4.3–6)
HDLC SERPL-MCNC: 57 MG/DL
LDLC SERPL CALC-MCNC: 63 MG/DL
MICROALBUMIN UR-MCNC: <5 MG/L
MICROALBUMIN/CREAT UR: NORMAL MG/G CR (ref 0–17)
NONHDLC SERPL-MCNC: 82 MG/DL
TRIGL SERPL-MCNC: 96 MG/DL

## 2017-04-10 PROCEDURE — 36415 COLL VENOUS BLD VENIPUNCTURE: CPT | Performed by: FAMILY MEDICINE

## 2017-04-10 PROCEDURE — 83036 HEMOGLOBIN GLYCOSYLATED A1C: CPT | Performed by: FAMILY MEDICINE

## 2017-04-10 PROCEDURE — 80061 LIPID PANEL: CPT | Performed by: FAMILY MEDICINE

## 2017-04-10 PROCEDURE — 99214 OFFICE O/P EST MOD 30 MIN: CPT | Mod: 25 | Performed by: FAMILY MEDICINE

## 2017-04-10 PROCEDURE — 99207 C FOOT EXAM  NO CHARGE: CPT | Performed by: FAMILY MEDICINE

## 2017-04-10 PROCEDURE — 82043 UR ALBUMIN QUANTITATIVE: CPT | Performed by: FAMILY MEDICINE

## 2017-04-10 RX ORDER — SILDENAFIL 100 MG/1
50-100 TABLET, FILM COATED ORAL DAILY PRN
Qty: 6 TABLET | Refills: 3 | Status: SHIPPED | OUTPATIENT
Start: 2017-04-10 | End: 2017-12-14

## 2017-04-10 ASSESSMENT — PAIN SCALES - GENERAL: PAINLEVEL: NO PAIN (0)

## 2017-04-10 NOTE — PROGRESS NOTES
SUBJECTIVE:                                                    Alfred Koenig is a 51 year old male who presents to clinic today for the following health issues:       Diabetes Follow-up      Patient is checking blood sugars: 3-4 times daily averaging about 120-130    Diabetic concerns: None     Symptoms of hypoglycemia (low blood sugar): none     Paresthesias (numbness or burning in feet) or sores: No     Date of last diabetic eye exam: 03/28/2017       Amount of exercise or physical activity: 4-5 days/week for an average of 15-30 minutes    Problems taking medications regularly: No    Medication side effects: none    Diet: low salt and low fat/cholesterol      none    Problem list and histories reviewed & adjusted, as indicated.  Additional history: as documented         Reviewed and updated as needed this visit by clinical staff  Tobacco  Allergies  Meds  Problems  Med Hx  Surg Hx  Fam Hx  Soc Hx        Reviewed and updated as needed this visit by Provider          sometimes down to 80s    Walking, some running for exercise    Has dog    Eating fairly healthy diet    Saw ent and optometry recently    Physical Exam   Constitutional: He is oriented to person, place, and time and well-developed, well-nourished, and in no distress. No distress.   HENT:   Head: Normocephalic and atraumatic.   Eyes: Conjunctivae are normal.   Neck: Carotid bruit is not present.   Cardiovascular: Normal rate, regular rhythm, normal heart sounds and intact distal pulses.  Exam reveals no gallop and no friction rub.    No murmur heard.  Pulmonary/Chest: Effort normal and breath sounds normal. No respiratory distress. He has no wheezes. He has no rales.   Musculoskeletal: He exhibits no edema.   Neurological: He is alert and oriented to person, place, and time.   Skin: He is not diaphoretic.   Psychiatric: Mood and affect normal.       Foot exam normal bilat       ASSESSMENT / PLAN:  (E11.319,  Z79.4) Type 2 diabetes mellitus with  retinopathy without macular edema, with long-term current use of insulin, unspecified laterality, unspecified retinopathy severity (H)  (primary encounter diagnosis)  Comment: recheck hemoglobin a1c, hopefully will be better; 8.3 last time   Plan: FOOT EXAM, Albumin Random Urine Quantitative,         Hemoglobin A1c, insulin pen needle 31G X 8 MM        Keep working on healthy diet/exercise     (E78.5) Hyperlipidemia LDL goal <100  Comment: recheck   Plan: Lipid panel reflex to direct LDL        On crestor     (Z12.11) Screen for colon cancer  Comment: due for this   Plan: Fecal colorectal cancer screen (FIT)             (N52.9) Erectile dysfunction, unspecified erectile dysfunction type  Comment: needs this reprinted; lost the last paper prescription   Plan: sildenafil (VIAGRA) 100 MG cap/tab               I reviewed the patient's medications, allergies, medical history, family history, and social history.    Davon Little MD

## 2017-04-10 NOTE — NURSING NOTE
"Chief Complaint   Patient presents with     Diabetes     Health Maintenance       Initial /75 (BP Location: Left arm, Patient Position: Chair, Cuff Size: Adult Regular)  Pulse 65  Temp 97.4  F (36.3  C) (Oral)  Wt 151 lb (68.5 kg)  BMI 25.92 kg/m2 Estimated body mass index is 25.92 kg/(m^2) as calculated from the following:    Height as of 1/24/17: 5' 4\" (1.626 m).    Weight as of this encounter: 151 lb (68.5 kg).  Medication Reconciliation: complete   Erica Broderick CMA      "

## 2017-04-10 NOTE — MR AVS SNAPSHOT
"              After Visit Summary   4/10/2017    Alfred Koenig    MRN: [id number]           Patient Information     Date Of Birth          1965        Visit Information        Provider Department      4/10/2017 7:00 AM Davon Little MD [facility]        Today's Diagnoses     Type 2 diabetes mellitus with background retinopathy without macular edema    -  1    Hyperlipidemia LDL goal <100        Screen for colon cancer          Care Instructions    We will send you lab results    Plan on same medications    If labs are better, see us in about 6 months     Keep working on healthy diet/exercise     Return the stool card test ( FIT test )        Follow-ups after your visit        Future tests that were ordered for you today     Open Future Orders        Priority Expected Expires Ordered    Fecal colorectal cancer screen (FIT) Routine 4/24/2017 6/26/2017 4/10/2017            Who to contact     If you have questions or need follow up information about today's clinic visit or your schedule please contact [facility] directly at [phone].  Normal or non-critical lab and imaging results will be communicated to you by MyChart, letter or phone within 4 business days after the clinic has received the results. If you do not hear from us within 7 days, please contact the clinic through [employer] or phone. If you have a critical or abnormal lab result, we will notify you by phone as soon as possible.  Submit refill requests through [employer] or call your pharmacy and they will forward the refill request to us. Please allow 3 business days for your refill to be completed.          Additional Information About Your Visit        [employer] Information     [employer] lets you send messages to your doctor, view your test results, renew your prescriptions, schedule appointments and more. To sign up, go to [email or web address] . Click on \"Log in\" on the left side of the screen, which " "will take you to the Welcome page. Then click on \"Sign up Now\" on the right side of the page.     You will be asked to enter the access code listed below, as well as some personal information. Please follow the directions to create your username and password.     Your access code is: [email or web address]  Expires: 2017 11:23 AM     Your access code will  in 90 days. If you need help or a new code, please call your [facility] clinic or [phone].        Care EveryWhere ID     This is your Care EveryWhere ID. This could be used by other organizations to access your [facility] medical records  [id number]        Your Vitals Were     Pulse Temperature BMI (Body Mass Index)             65 97.4  F (36.3  C) (Oral) 25.92 kg/m2          Blood Pressure from Last 3 Encounters:   04/10/17 123/75   17 133/74   16 130/72    Weight from Last 3 Encounters:   04/10/17 151 lb (68.5 kg)   17 151 lb (68.5 kg)   16 148 lb (67.1 kg)              We Performed the Following     Albumin Random Urine Quantitative     FOOT EXAM     Hemoglobin A1c     Lipid panel reflex to direct LDL        Primary Care Provider Office Phone # Fax #    Davon Little [phone]       [address] [address]        Thank you!     Thank you for choosing [facility]  for your care. Our goal is always to provide you with excellent care. Hearing back from our patients is one way we can continue to improve our services. Please take a few minutes to complete the written survey that you may receive in the mail after your visit with us. Thank you!             Your Updated Medication List - Protect others around you: Learn how to safely use, store and throw away your medicines at www.disposemymeds.org.          This list is accurate as of: 4/10/17  7:23 AM.  Always use your most recent med list.                   Brand Name Dispense Instructions for use "    ACE/ARB NOT PRESCRIBED (INTENTIONAL)      1 each continuous prn. ACE & ARB not prescribed due to Other: blood pressure fine, no microalbuminuria       aspirin 81 MG tablet     100 Tab    ONE DAILY       blood glucose monitoring lancets     1 Box    1 each 3 times daily       blood glucose monitoring meter device kit    no brand specified    1 kit    Use to test blood sugar 3 times daily or as directed.  Needs new meter and then also include all  Needed strips, lancets, and other supplies, 3 months worth, refills for a year       * blood glucose monitoring test strip    no brand specified    3 Month    Use to test blood sugar 3 times daily (Contour NEXT Test Strips).       * blood glucose monitoring test strip    SHRADDHA CONTOUR NEXT    100 each    Please note needs Contour NEXT  test stripsby In Vitro route 3 times daily. Please note needs Contour NEXT  test strips       glipiZIDE 10 MG tablet    GLUCOTROL    180 tablet    Take 1 tablet (10 mg) by mouth 2 times daily (before meals)       insulin glargine 100 UNIT/ML injection    LANTUS    3 Month    50 units at bedtime       insulin pen needle 31G X 8 MM     100 each    1 Device daily       metFORMIN 1000 MG tablet    GLUCOPHAGE    180 tablet    Take 1 tablet (1,000 mg) by mouth 2 times daily (with meals)       pseudoePHEDrine 120 MG 12 hr tablet    SUDAFED    28 tablet    Take 1 tablet (120 mg) by mouth every 12 hours as needed for congestion       rosuvastatin 40 MG tablet    CRESTOR    90 tablet    TAKE 1 TABLET (40 MG) BY MOUTH DAILY       sildenafil 100 MG cap/tab    VIAGRA    6 tablet    Take 0.5-1 tablets ( mg) by mouth daily as needed for erectile dysfunction Take 30 min to 4 hours before intercourse.  Never use with nitroglycerin, terazosin or doxazosin.       * Notice:  This list has 2 medication(s) that are the same as other medications prescribed for you. Read the directions carefully, and ask your doctor or other care provider to review them with  you.

## 2017-04-10 NOTE — PATIENT INSTRUCTIONS
We will send you lab results    Plan on same medications    If labs are better, see us in about 6 months     Keep working on healthy diet/exercise     Return the stool card test ( FIT test )

## 2017-04-12 NOTE — PROGRESS NOTES
The diabetes test ( hemoglobin a1c ) is still higher than desireable ( ideally we want it about 7 ).  Increase insulin as needed, and keep working on healthy diet/exercise.    Then see us in about 3 months in clinic.    Other labs are fine.    Davon Little MD

## 2017-04-26 DIAGNOSIS — N52.9 ERECTILE DYSFUNCTION, UNSPECIFIED ERECTILE DYSFUNCTION TYPE: ICD-10-CM

## 2017-04-26 DIAGNOSIS — Z79.4 TYPE 2 DIABETES MELLITUS WITHOUT COMPLICATION, WITH LONG-TERM CURRENT USE OF INSULIN (H): ICD-10-CM

## 2017-04-26 DIAGNOSIS — E11.9 TYPE 2 DIABETES MELLITUS WITHOUT COMPLICATION, WITH LONG-TERM CURRENT USE OF INSULIN (H): ICD-10-CM

## 2017-04-27 NOTE — TELEPHONE ENCOUNTER
metFORMIN (GLUCOPHAGE) 1000 MG tablet         Last Written Prescription Date: 12-6-16  Last Fill Quantity: 180, # refills: 1  Last Office Visit with [facility] or [facility] prescribing provider:  4-10-17        BP Readings from Last 3 Encounters:   04/10/17 123/75   01/24/17 133/74   12/06/16 130/72     Lab Results   Component Value Date    MICROL <5 04/10/2017     Lab Results   Component Value Date    UMALCR Unable to calculate due to low value 04/10/2017     Creatinine   Date Value Ref Range Status   12/06/2016 0.83 0.66 - 1.25 mg/dL Final   ]  GFR Estimate   Date Value Ref Range Status   12/06/2016 >90  Non  GFR Calc   >60 mL/min/1.7m2 Final   03/23/2016 >90  Non  GFR Calc   >60 mL/min/1.7m2 Final   04/14/2015 >90  Non  GFR Calc   >60 mL/min/1.7m2 Final     GFR Estimate If Black   Date Value Ref Range Status   12/06/2016 >90   GFR Calc   >60 mL/min/1.7m2 Final   03/23/2016 >90   GFR Calc   >60 mL/min/1.7m2 Final   04/14/2015 >90   GFR Calc   >60 mL/min/1.7m2 Final     Lab Results   Component Value Date    CHOL 139 04/10/2017     Lab Results   Component Value Date    HDL 57 04/10/2017     Lab Results   Component Value Date    LDL 63 04/10/2017     Lab Results   Component Value Date    TRIG 96 04/10/2017     Lab Results   Component Value Date    CHOLHDLRATIO 3.5 04/14/2015     Lab Results   Component Value Date    AST 17 12/06/2016     Lab Results   Component Value Date    ALT 24 12/06/2016     Lab Results   Component Value Date    A1C 8.0 04/10/2017    A1C 8.3 12/06/2016    A1C 7.9 03/23/2016    A1C 8.1 04/14/2015    A1C 7.7 04/09/2014     Potassium   Date Value Ref Range Status   12/06/2016 4.4 3.4 - 5.3 mmol/L Final     sildenafil (VIAGRA) 100 MG cap/tab      Last Written Prescription Date: 4-10-17  Last Fill Quantity: 6,  # refills: 3   Last Office Visit with [facility], [facility] or [facility] prescribing provider: 4-10-17

## 2017-04-28 RX ORDER — SILDENAFIL CITRATE 100 MG
TABLET ORAL
Qty: 6 TABLET | Refills: 1 | OUTPATIENT
Start: 2017-04-28

## 2017-04-28 NOTE — TELEPHONE ENCOUNTER
Viagra was locally printed 4/10/17, possibly given to patient with AVS.  Called and spoke with patient who does have this script at home.  He requested a refill because he did not know he could bring paper script in and that it would still be good.  RN advised to bring in Viagra script.  Metformin Prescription approved per [facility] Refill Protocol.    Tatiana Calixto RN  [facility]

## 2017-07-05 ENCOUNTER — TELEPHONE (OUTPATIENT)
Dept: FAMILY MEDICINE | Facility: CLINIC | Age: 52
End: 2017-07-05

## 2017-07-05 NOTE — LETTER
August 7, 2017    Alfred Koenig  [address]  [place] [address]      Dear Alfred Koenig,     We have tried to contact you about your health, but have been unable to reach you.  Please call us as soon as possible so we can provide you with the best care possible.  We will continue to check in with you throughout the year to complete these items of care, if you are not able to complete these items at this time.  If you would like to complete the missing items for your care, please contact us at [phone].    We recommend the following:  -complete a FIT TEST a FIT test or Fecal Immunochemical Occult Blood Test is a take home stool sample kit.  It does not replace the colonoscopy for colorectal cancer screening, but it can detect hidden bleeding in the lower colon.  It does need to be repeated every year and if a positive result is obtained, you would be referred for a colonoscopy.  If you have completed either one of these tests at another facility, please have the records sent to our clinic so that we can best coordinate your care.    Sincerely,     Your Care Team at [place]

## 2017-07-05 NOTE — LETTER
July 5, 2017    Alfred Koenig  [address]  [place] [address]    Dear Alfred    We care about your health and have reviewed your health plan. We have reviewed your medical conditions, medication list, and lab results and are making recommendations based on this review, to better manage your health.    You are in particular need of attention regarding:  - Completing a Colon Cancer Screening (FIT) - Please complete FIT test which we gave to you in April to complete and mail completed test to clinic.      Here is a list of Health Maintenance topics that are due now or due soon:  Health Maintenance Due   Topic Date Due     DIABETIC EDUCATION Q1 YEAR  01/11/2014     FIT Q1 YR  03/30/2017     We will be calling you in the next couple of weeks to help you schedule any appointments that are needed.  Please call us at [phone] (or use [employer]) to address the above recommendations.     Thank you for trusting [facility] and we appreciate the opportunity to serve you.  We look forward to supporting your healthcare needs in the future.    Healthy Regards,    MD Erica Cain CMA

## 2017-07-05 NOTE — TELEPHONE ENCOUNTER
Panel Management Review      Patient has the following on his problem list:     Diabetes    ASA: Passed    Last A1C  Lab Results   Component Value Date    A1C 8.0 04/10/2017    A1C 8.3 12/06/2016    A1C 7.9 03/23/2016    A1C 8.1 04/14/2015    A1C 7.7 04/09/2014     A1C tested: MONITOR    Last LDL:    Lab Results   Component Value Date    CHOL 139 04/10/2017     Lab Results   Component Value Date    HDL 57 04/10/2017     Lab Results   Component Value Date    LDL 63 04/10/2017     Lab Results   Component Value Date    TRIG 96 04/10/2017     Lab Results   Component Value Date    CHOLHDLRATIO 3.5 04/14/2015     Lab Results   Component Value Date    NHDL 82 04/10/2017       Is the patient on a Statin? YES             Is the patient on Aspirin? YES    Medications     HMG CoA Reductase Inhibitors    rosuvastatin (CRESTOR) 40 MG tablet    Salicylates    ASPIRIN 81 MG PO TABS          Last three blood pressure readings:  BP Readings from Last 3 Encounters:   04/10/17 123/75   01/24/17 133/74   12/06/16 130/72       Date of last diabetes office visit: 04/2017     Tobacco History:     History   Smoking Status     Never Smoker   Smokeless Tobacco     Never Used           Composite cancer screening  Chart review shows that this patient is due/due soon for the following Fecal Colorectal (FIT)  Summary:    Patient is due/failing the following:   FIT    Action needed:   Patient needs referral/order: fit test given in April    Type of outreach:    Sent letter. 07/05/2017    Questions for provider review:    None                                                                                                                                    Erica Broderick [facility]       Chart routed to Care Team .

## 2017-07-18 ENCOUNTER — OFFICE VISIT (OUTPATIENT)
Dept: FAMILY MEDICINE | Facility: CLINIC | Age: 52
End: 2017-07-18
Payer: COMMERCIAL

## 2017-07-18 VITALS
BODY MASS INDEX: 24.24 KG/M2 | HEART RATE: 83 BPM | TEMPERATURE: 98.1 F | SYSTOLIC BLOOD PRESSURE: 120 MMHG | DIASTOLIC BLOOD PRESSURE: 74 MMHG | WEIGHT: 142 LBS | HEIGHT: 64 IN

## 2017-07-18 DIAGNOSIS — L03.119 CELLULITIS AND ABSCESS OF LEG: Primary | ICD-10-CM

## 2017-07-18 DIAGNOSIS — L02.419 CELLULITIS AND ABSCESS OF LEG: Primary | ICD-10-CM

## 2017-07-18 DIAGNOSIS — Z79.4 TYPE 2 DIABETES MELLITUS WITH RETINOPATHY WITHOUT MACULAR EDEMA, WITH LONG-TERM CURRENT USE OF INSULIN, UNSPECIFIED LATERALITY, UNSPECIFIED RETINOPATHY SEVERITY (H): ICD-10-CM

## 2017-07-18 DIAGNOSIS — E11.319 TYPE 2 DIABETES MELLITUS WITH RETINOPATHY WITHOUT MACULAR EDEMA, WITH LONG-TERM CURRENT USE OF INSULIN, UNSPECIFIED LATERALITY, UNSPECIFIED RETINOPATHY SEVERITY (H): ICD-10-CM

## 2017-07-18 PROCEDURE — 99213 OFFICE O/P EST LOW 20 MIN: CPT | Performed by: FAMILY MEDICINE

## 2017-07-18 RX ORDER — CEPHALEXIN 500 MG/1
500 CAPSULE ORAL 3 TIMES DAILY
Qty: 30 CAPSULE | Refills: 0 | Status: SHIPPED | OUTPATIENT
Start: 2017-07-18 | End: 2017-12-14

## 2017-07-18 NOTE — PATIENT INSTRUCTIONS
Discharge Instructions for Cellulitis  You have been diagnosed with cellulitis. This is an infection in the deepest layer of the skin. In some cases, the infection also affects the muscle. Cellulitis is caused by bacteria. The bacteria can enter the body through broken skin. This can happen with a cut, scratch, animal bite, or an insect bite that has been scratched. You may have been treated in the hospital with antibiotics and fluids. You will likely be given a prescription for antibiotics to take at home. This sheet will help you take care of yourself at home.  Home care  When you are home:    Take the prescribed antibiotic medicine you are given as directed until it is gone. Take it even if you feel better. It treats the infection and stops it from returning. Not taking all the medicine can make future infections hard to treat.    Keep the infected area clean.    When possible, raise the infected area above the level of your heart. This helps keep swelling down.    Talk with your healthcare provider if you are in pain. Ask what kind of over-the-counter medicine you can take for pain.    Apply clean bandages as advised.    Take your temperature once a day for a week.    Wash your hands often to prevent spreading the infection.  In the future, wash your hands before and after you touch cuts, scratches, or bandages. This will help prevent infection.   When to call your healthcare provider  Call your healthcare provider immediately if you have any of the following:    Difficulty or pain when moving the joints above or below the infected area    Discharge or pus draining from the area    Fever of 100.4 F (38 C) or higher, or as directed by your healthcare provider    Pain that gets worse in or around the infected     Redness that gets worse in or around the infected area, particularly if the area of redness expands to a wider area    Shaking chills    Swelling of the infected area    Vomiting   Date Last Reviewed:  8/1/2016 2000-2017 [employer]. [address] [place] [address], [place] [address]. All rights reserved. This information is not intended as a substitute for professional medical care. Always follow your healthcare professional's instructions.

## 2017-07-18 NOTE — MR AVS SNAPSHOT
After Visit Summary   7/18/2017    Alfred Koenig    MRN: [id number]           Patient Information     Date Of Birth          1965        Visit Information        Provider Department      7/18/2017 10:40 AM Todd Mcgregor MD [facility]        Today's Diagnoses     Cellulitis and abscess of leg    -  1      Care Instructions      Discharge Instructions for Cellulitis  You have been diagnosed with cellulitis. This is an infection in the deepest layer of the skin. In some cases, the infection also affects the muscle. Cellulitis is caused by bacteria. The bacteria can enter the body through broken skin. This can happen with a cut, scratch, animal bite, or an insect bite that has been scratched. You may have been treated in the hospital with antibiotics and fluids. You will likely be given a prescription for antibiotics to take at home. This sheet will help you take care of yourself at home.  Home care  When you are home:    Take the prescribed antibiotic medicine you are given as directed until it is gone. Take it even if you feel better. It treats the infection and stops it from returning. Not taking all the medicine can make future infections hard to treat.    Keep the infected area clean.    When possible, raise the infected area above the level of your heart. This helps keep swelling down.    Talk with your healthcare provider if you are in pain. Ask what kind of over-the-counter medicine you can take for pain.    Apply clean bandages as advised.    Take your temperature once a day for a week.    Wash your hands often to prevent spreading the infection.  In the future, wash your hands before and after you touch cuts, scratches, or bandages. This will help prevent infection.   When to call your healthcare provider  Call your healthcare provider immediately if you have any of the following:    Difficulty or pain when moving the joints above or below the infected  "area    Discharge or pus draining from the area    Fever of 100.4 F (38 C) or higher, or as directed by your healthcare provider    Pain that gets worse in or around the infected     Redness that gets worse in or around the infected area, particularly if the area of redness expands to a wider area    Shaking chills    Swelling of the infected area    Vomiting   Date Last Reviewed: 8/1/2016 2000-2017 [employer]. [address] [place] [address], [place] [address]. All rights reserved. This information is not intended as a substitute for professional medical care. Always follow your healthcare professional's instructions.              Follow-ups after your visit        Who to contact     If you have questions or need follow up information about today's clinic visit or your schedule please contact [facility] directly at [phone].  Normal or non-critical lab and imaging results will be communicated to you by [employer], letter or phone within 4 business days after the clinic has received the results. If you do not hear from us within 7 days, please contact the clinic through MyChart or phone. If you have a critical or abnormal lab result, we will notify you by phone as soon as possible.  Submit refill requests through [employer] or call your pharmacy and they will forward the refill request to us. Please allow 3 business days for your refill to be completed.          Additional Information About Your Visit        [employer] Information     [employer] lets you send messages to your doctor, view your test results, renew your prescriptions, schedule appointments and more. To sign up, go to [email or web address] . Click on \"Log in\" on the left side of the screen, which will take you to the Welcome page. Then click on \"Sign up Now\" on the right side of the page.     You will be asked to enter the access code listed below, as well as some personal information. Please follow the directions to create " "your username and password.     Your access code is: [email or web address]  Expires: 10/16/2017 11:00 AM     Your access code will  in 90 days. If you need help or a new code, please call your [facility] clinic or [phone].        Care EveryWhere ID     This is your Care EveryWhere ID. This could be used by other organizations to access your [facility] medical records  [id number]        Your Vitals Were     Pulse Temperature Height BMI (Body Mass Index)          83 98.1  F (36.7  C) (Oral) 5' 4.02\" (1.626 m) 24.36 kg/m2         Blood Pressure from Last 3 Encounters:   17 141/78   04/10/17 123/75   17 133/74    Weight from Last 3 Encounters:   17 142 lb (64.4 kg)   04/10/17 151 lb (68.5 kg)   17 151 lb (68.5 kg)              Today, you had the following     No orders found for display         Today's Medication Changes          These changes are accurate as of: 17 11:00 AM.  If you have any questions, ask your nurse or doctor.               Start taking these medicines.        Dose/Directions    cephALEXin 500 MG capsule   Commonly known as:  KEFLEX   Used for:  Cellulitis and abscess of leg   Started by:  Todd Mcgregor MD        Dose:  500 mg   Take 1 capsule (500 mg) by mouth 3 times daily   Quantity:  30 capsule   Refills:  0            Where to get your medicines      These medications were sent to [facility]/pharmacy #[id number] - [address] AT [facility] OF [address] [place] [address]     Phone:  [phone]     cephALEXin 500 MG capsule                Primary Care Provider Office Phone # Fax #    Davon Little [phone]       [facility] [address] [facility] [address]        Equal Access to Services     [facility] : Cadnelaria Delong, mustapha ralph, guadalupe albrecht . [facility] [phone].    ATENCIÓN: Si benedicto alexander " disposición servicios gratuitos de asistencia lingüística. Dilcia samson [phone].    We comply with applicable federal civil rights laws and [place] laws. We do not discriminate on the basis of race, color, national origin, age, disability sex, sexual orientation or gender identity.            Thank you!     Thank you for choosing [facility]  for your care. Our goal is always to provide you with excellent care. Hearing back from our patients is one way we can continue to improve our services. Please take a few minutes to complete the written survey that you may receive in the mail after your visit with us. Thank you!             Your Updated Medication List - Protect others around you: Learn how to safely use, store and throw away your medicines at www.disposemymeds.org.          This list is accurate as of: 7/18/17 11:00 AM.  Always use your most recent med list.                   Brand Name Dispense Instructions for use Diagnosis    ACE/ARB NOT PRESCRIBED (INTENTIONAL)      1 each continuous prn. ACE & ARB not prescribed due to Other: blood pressure fine, no microalbuminuria    Type 2 diabetes, HbA1C goal < 8% (H)       aspirin 81 MG tablet     100 Tab    ONE DAILY    Diabetes mellitus, type 2 (H)       blood glucose monitoring lancets     1 Box    1 each 3 times daily    Type 2 diabetes mellitus without complication, with long-term current use of insulin (H)       blood glucose monitoring meter device kit    no brand specified    1 kit    Use to test blood sugar 3 times daily or as directed.  Needs new meter and then also include all  Needed strips, lancets, and other supplies, 3 months worth, refills for a year    Type 2 diabetes mellitus without complication, with long-term current use of insulin (H)       * blood glucose monitoring test strip    no brand specified    3 Month    Use to test blood sugar 3 times daily (Contour NEXT Test Strips).    Type 2 diabetes mellitus without  complication (H)       * blood glucose monitoring test strip    SHRADDHA CONTOUR NEXT    100 each    Please note needs Contour NEXT  test stripsby In Vitro route 3 times daily. Please note needs Contour NEXT  test strips    Type 2 diabetes mellitus without complication, with long-term current use of insulin (H)       cephALEXin 500 MG capsule    KEFLEX    30 capsule    Take 1 capsule (500 mg) by mouth 3 times daily    Cellulitis and abscess of leg       glipiZIDE 10 MG tablet    GLUCOTROL    180 tablet    Take 1 tablet (10 mg) by mouth 2 times daily (before meals)    Type 2 diabetes mellitus without complication, with long-term current use of insulin (H)       insulin glargine 100 UNIT/ML injection    LANTUS    3 Month    50 units at bedtime    Type 2 diabetes mellitus without complication, with long-term current use of insulin (H)       insulin pen needle 31G X 8 MM     100 each    1 Device 2 times daily    Type 2 diabetes mellitus with retinopathy without macular edema, with long-term current use of insulin, unspecified laterality, unspecified retinopathy severity (H)       metFORMIN 1000 MG tablet    GLUCOPHAGE    180 tablet    TAKE 1 TABLET (1,000 MG) BY MOUTH 2 TIMES DAILY (WITH MEALS)    Type 2 diabetes mellitus without complication, with long-term current use of insulin (H)       pseudoePHEDrine 120 MG 12 hr tablet    SUDAFED    28 tablet    Take 1 tablet (120 mg) by mouth every 12 hours as needed for congestion    Dysfunction of eustachian tube, unspecified laterality       rosuvastatin 40 MG tablet    CRESTOR    90 tablet    TAKE 1 TABLET (40 MG) BY MOUTH DAILY    Hyperlipidemia LDL goal <100       sildenafil 100 MG cap/tab    VIAGRA    6 tablet    Take 0.5-1 tablets ( mg) by mouth daily as needed for erectile dysfunction Take 30 min to 4 hours before intercourse.  Never use with nitroglycerin, terazosin or doxazosin.    Erectile dysfunction, unspecified erectile dysfunction type       * Notice:  This  list has 2 medication(s) that are the same as other medications prescribed for you. Read the directions carefully, and ask your doctor or other care provider to review them with you.

## 2017-07-18 NOTE — NURSING NOTE
"Chief Complaint   Patient presents with     Knee Pain       Initial /78 (BP Location: Left arm, Patient Position: Chair, Cuff Size: Adult Regular)  Pulse 83  Temp 98.1  F (36.7  C) (Oral)  Ht 5' 4.02\" (1.626 m)  Wt 142 lb (64.4 kg)  BMI 24.36 kg/m2 Estimated body mass index is 24.36 kg/(m^2) as calculated from the following:    Height as of this encounter: 5' 4.02\" (1.626 m).    Weight as of this encounter: 142 lb (64.4 kg).  Medication Reconciliation: complete   Leonie Duke CMA       "

## 2017-07-21 NOTE — TELEPHONE ENCOUNTER
I called and left message for patient to call back regarding completing and mailing back fit test  Erica Broderick CMA

## 2017-08-16 ENCOUNTER — TELEPHONE (OUTPATIENT)
Dept: FAMILY MEDICINE | Facility: CLINIC | Age: 52
End: 2017-08-16

## 2017-08-16 DIAGNOSIS — Z79.4 TYPE 2 DIABETES MELLITUS WITH RETINOPATHY WITHOUT MACULAR EDEMA, WITH LONG-TERM CURRENT USE OF INSULIN, UNSPECIFIED LATERALITY, UNSPECIFIED RETINOPATHY SEVERITY (H): Primary | ICD-10-CM

## 2017-08-16 DIAGNOSIS — E11.319 TYPE 2 DIABETES MELLITUS WITH RETINOPATHY WITHOUT MACULAR EDEMA, WITH LONG-TERM CURRENT USE OF INSULIN, UNSPECIFIED LATERALITY, UNSPECIFIED RETINOPATHY SEVERITY (H): Primary | ICD-10-CM

## 2017-08-16 NOTE — TELEPHONE ENCOUNTER
PA is needed for the medication, glucometer and test strips . Would you like to start PA or Rx alternative medication? If PA, please list all medications this patient has tried along with any contraindications that may have been experienced in the past. Thank you.    Pharmacy: [facility]  Phone: [phone]    Insurance Plan:[employer] prescription plus  Phone: [phone]  Pt ID: [id number]  Group:       Insurance covers [employer] please send in new prescription thanks  Forwarded to Dr. Little for review.  Erica Broderick CMA

## 2017-10-06 ENCOUNTER — TELEPHONE (OUTPATIENT)
Dept: FAMILY MEDICINE | Facility: CLINIC | Age: 52
End: 2017-10-06

## 2017-10-06 DIAGNOSIS — Z79.4 TYPE 2 DIABETES MELLITUS WITHOUT COMPLICATION, WITH LONG-TERM CURRENT USE OF INSULIN (H): ICD-10-CM

## 2017-10-06 DIAGNOSIS — E11.9 TYPE 2 DIABETES MELLITUS WITHOUT COMPLICATION, WITH LONG-TERM CURRENT USE OF INSULIN (H): ICD-10-CM

## 2017-10-06 NOTE — TELEPHONE ENCOUNTER
Routing refill request to provider for review/approval because:  Labs out of range:  FLP/LDL      Tatiana Calixto RN  [facility]

## 2017-10-06 NOTE — TELEPHONE ENCOUNTER
metFORMIN (GLUCOPHAGE) 1000 MG tablet         Last Written Prescription Date: 4/28/17  Last Fill Quantity: 180, # refills: 1  Last Office Visit with G, P or [facility] prescribing provider:  7/18/17        BP Readings from Last 3 Encounters:   07/18/17 120/74   04/10/17 123/75   01/24/17 133/74     Lab Results   Component Value Date    MICROL <5 04/10/2017     Lab Results   Component Value Date    UMALCR Unable to calculate due to low value 04/10/2017     Creatinine   Date Value Ref Range Status   12/06/2016 0.83 0.66 - 1.25 mg/dL Final   ]  GFR Estimate   Date Value Ref Range Status   12/06/2016 >90  Non  GFR Calc   >60 mL/min/1.7m2 Final   03/23/2016 >90  Non  GFR Calc   >60 mL/min/1.7m2 Final   04/14/2015 >90  Non  GFR Calc   >60 mL/min/1.7m2 Final     GFR Estimate If Black   Date Value Ref Range Status   12/06/2016 >90   GFR Calc   >60 mL/min/1.7m2 Final   03/23/2016 >90   GFR Calc   >60 mL/min/1.7m2 Final   04/14/2015 >90   GFR Calc   >60 mL/min/1.7m2 Final     Lab Results   Component Value Date    CHOL 139 04/10/2017     Lab Results   Component Value Date    HDL 57 04/10/2017     Lab Results   Component Value Date    LDL 63 04/10/2017     Lab Results   Component Value Date    TRIG 96 04/10/2017     Lab Results   Component Value Date    CHOLHDLRATIO 3.5 04/14/2015     Lab Results   Component Value Date    AST 17 12/06/2016     Lab Results   Component Value Date    ALT 24 12/06/2016     Lab Results   Component Value Date    A1C 8.0 04/10/2017    A1C 8.3 12/06/2016    A1C 7.9 03/23/2016    A1C 8.1 04/14/2015    A1C 7.7 04/09/2014     Potassium   Date Value Ref Range Status   12/06/2016 4.4 3.4 - 5.3 mmol/L Final

## 2017-11-11 ENCOUNTER — TELEPHONE (OUTPATIENT)
Dept: FAMILY MEDICINE | Facility: CLINIC | Age: 52
End: 2017-11-11

## 2017-11-11 DIAGNOSIS — E11.9 TYPE 2 DIABETES MELLITUS WITHOUT COMPLICATION, WITH LONG-TERM CURRENT USE OF INSULIN (H): ICD-10-CM

## 2017-11-11 DIAGNOSIS — Z79.4 TYPE 2 DIABETES MELLITUS WITHOUT COMPLICATION, WITH LONG-TERM CURRENT USE OF INSULIN (H): ICD-10-CM

## 2017-11-13 NOTE — TELEPHONE ENCOUNTER
Routing refill request to provider for review/approval because:  Ujdi given x1 and patient did not follow up, please advise.  No future appt noted at this time.      Tatiana Calixto RN  [facility]

## 2017-11-13 NOTE — TELEPHONE ENCOUNTER
Requested Prescriptions   Pending Prescriptions Disp Refills     metFORMIN (GLUCOPHAGE) 1000 MG tablet [Pharmacy Med Name: METFORMIN HCL 1,000 MG TABLET] 60 tablet 0     Sig: TAKE 1 TABLET (1,000 MG) BY MOUTH 2 TIMES DAILY (WITH MEALS) *NEED APPT*    Biguanide Agents Failed    11/11/2017 10:33 AM       Failed - Patient has documented A1c within the specified period of time.    Recent Labs   Lab Test  04/10/17   0731   A1C  8.0*            Passed - Patient's BP is less than 140/90    BP Readings from Last 3 Encounters:   07/18/17 120/74   04/10/17 123/75   01/24/17 133/74                Passed - Patient has documented LDL within the past 12 mos.    Recent Labs   Lab Test  04/10/17   0731   LDL  63            Passed - Patient has had a Microalbumin in the past 12 mos.    Recent Labs   Lab Test  04/10/17   0730   MICROL  <5   UMALCR  Unable to calculate due to low value            Passed - Patient is age 10 or older       Passed - Patient's CR is NOT>1.4 OR Patient's EGFR is NOT<45 within past 12 mos.    Recent Labs   Lab Test  12/06/16   0827   GFRESTIMATED  >90  Non  GFR Calc     GFRESTBLACK  >90   GFR Calc         Recent Labs   Lab Test  12/06/16   0827   CR  0.83            Passed - Patient does NOT have a diagnosis of CHF.       Passed - Recent (6 mos) or future visit with authorizing provider's specialty    Patient had office visit in the last 6 months or has a visit in the next 30 days with authorizing provider.  See chart review.

## 2017-11-13 NOTE — TELEPHONE ENCOUNTER
Message left on home number for patient to call back TC line.  NTBS for DM check with PCP.    Kanika ABEL

## 2017-12-14 ENCOUNTER — OFFICE VISIT (OUTPATIENT)
Dept: FAMILY MEDICINE | Facility: CLINIC | Age: 52
End: 2017-12-14
Payer: COMMERCIAL

## 2017-12-14 VITALS
WEIGHT: 140 LBS | TEMPERATURE: 97.5 F | OXYGEN SATURATION: 100 % | SYSTOLIC BLOOD PRESSURE: 108 MMHG | BODY MASS INDEX: 24.02 KG/M2 | DIASTOLIC BLOOD PRESSURE: 66 MMHG | HEART RATE: 68 BPM

## 2017-12-14 DIAGNOSIS — Z79.4 TYPE 2 DIABETES MELLITUS WITH RETINOPATHY WITHOUT MACULAR EDEMA, WITH LONG-TERM CURRENT USE OF INSULIN, UNSPECIFIED LATERALITY, UNSPECIFIED RETINOPATHY SEVERITY (H): Primary | ICD-10-CM

## 2017-12-14 DIAGNOSIS — E11.319 TYPE 2 DIABETES MELLITUS WITH RETINOPATHY WITHOUT MACULAR EDEMA, WITH LONG-TERM CURRENT USE OF INSULIN, UNSPECIFIED LATERALITY, UNSPECIFIED RETINOPATHY SEVERITY (H): Primary | ICD-10-CM

## 2017-12-14 DIAGNOSIS — Z12.11 SCREEN FOR COLON CANCER: ICD-10-CM

## 2017-12-14 DIAGNOSIS — Z12.5 SCREENING FOR PROSTATE CANCER: ICD-10-CM

## 2017-12-14 DIAGNOSIS — E78.5 HYPERLIPIDEMIA LDL GOAL <100: ICD-10-CM

## 2017-12-14 DIAGNOSIS — N52.9 ERECTILE DYSFUNCTION, UNSPECIFIED ERECTILE DYSFUNCTION TYPE: ICD-10-CM

## 2017-12-14 LAB
ALBUMIN SERPL-MCNC: 3.8 G/DL (ref 3.4–5)
ALP SERPL-CCNC: 79 U/L (ref 40–150)
ALT SERPL W P-5'-P-CCNC: 34 U/L (ref 0–70)
ANION GAP SERPL CALCULATED.3IONS-SCNC: 8 MMOL/L (ref 3–14)
AST SERPL W P-5'-P-CCNC: 24 U/L (ref 0–45)
BILIRUB SERPL-MCNC: 0.4 MG/DL (ref 0.2–1.3)
BUN SERPL-MCNC: 23 MG/DL (ref 7–30)
CALCIUM SERPL-MCNC: 8.6 MG/DL (ref 8.5–10.1)
CHLORIDE SERPL-SCNC: 101 MMOL/L (ref 94–109)
CHOLEST SERPL-MCNC: 228 MG/DL
CO2 SERPL-SCNC: 27 MMOL/L (ref 20–32)
CREAT SERPL-MCNC: 0.93 MG/DL (ref 0.66–1.25)
GFR SERPL CREATININE-BSD FRML MDRD: 84 ML/MIN/1.7M2
GLUCOSE SERPL-MCNC: 171 MG/DL (ref 70–99)
HBA1C MFR BLD: 7.7 % (ref 4.3–6)
HDLC SERPL-MCNC: 59 MG/DL
LDLC SERPL CALC-MCNC: 141 MG/DL
NONHDLC SERPL-MCNC: 169 MG/DL
POTASSIUM SERPL-SCNC: 4.2 MMOL/L (ref 3.4–5.3)
PROT SERPL-MCNC: 7.3 G/DL (ref 6.8–8.8)
PSA SERPL-ACNC: 1.23 UG/L (ref 0–4)
SODIUM SERPL-SCNC: 136 MMOL/L (ref 133–144)
TRIGL SERPL-MCNC: 142 MG/DL
TSH SERPL DL<=0.005 MIU/L-ACNC: 2.26 MU/L (ref 0.4–4)

## 2017-12-14 PROCEDURE — 84443 ASSAY THYROID STIM HORMONE: CPT | Performed by: FAMILY MEDICINE

## 2017-12-14 PROCEDURE — 83036 HEMOGLOBIN GLYCOSYLATED A1C: CPT | Performed by: FAMILY MEDICINE

## 2017-12-14 PROCEDURE — 36415 COLL VENOUS BLD VENIPUNCTURE: CPT | Performed by: FAMILY MEDICINE

## 2017-12-14 PROCEDURE — 80061 LIPID PANEL: CPT | Performed by: FAMILY MEDICINE

## 2017-12-14 PROCEDURE — G0103 PSA SCREENING: HCPCS | Performed by: FAMILY MEDICINE

## 2017-12-14 PROCEDURE — 80053 COMPREHEN METABOLIC PANEL: CPT | Performed by: FAMILY MEDICINE

## 2017-12-14 PROCEDURE — 99214 OFFICE O/P EST MOD 30 MIN: CPT | Performed by: FAMILY MEDICINE

## 2017-12-14 RX ORDER — ROSUVASTATIN CALCIUM 40 MG/1
TABLET, COATED ORAL
Qty: 90 TABLET | Refills: 3 | Status: SHIPPED | OUTPATIENT
Start: 2017-12-14 | End: 2018-11-30

## 2017-12-14 RX ORDER — SILDENAFIL 100 MG/1
50-100 TABLET, FILM COATED ORAL DAILY PRN
Qty: 6 TABLET | Refills: 3 | Status: SHIPPED | OUTPATIENT
Start: 2017-12-14 | End: 2018-05-15

## 2017-12-14 ASSESSMENT — PAIN SCALES - GENERAL: PAINLEVEL: NO PAIN (0)

## 2017-12-14 NOTE — NURSING NOTE
"Chief Complaint   Patient presents with     Diabetes     Health Maintenance       Initial /66 (BP Location: Right arm, Patient Position: Chair, Cuff Size: Adult Regular)  Pulse 68  Temp 97.5  F (36.4  C) (Oral)  Wt 140 lb (63.5 kg)  SpO2 100%  BMI 24.02 kg/m2 Estimated body mass index is 24.02 kg/(m^2) as calculated from the following:    Height as of 7/18/17: 5' 4.02\" (1.626 m).    Weight as of this encounter: 140 lb (63.5 kg).  Medication Reconciliation: complete   Erica Broderick CMA      "

## 2017-12-14 NOTE — PATIENT INSTRUCTIONS
Decrease metformin to 500 mg 2x daily    Adjust lantus as needed    Keep working on healthy diet/exercise     We will send you lab results    Return the FIT ( stool ) test

## 2017-12-14 NOTE — MR AVS SNAPSHOT
After Visit Summary   12/14/2017    Alfred Koenig    MRN: [id number]           Patient Information     Date Of Birth          1965        Visit Information        Provider Department      12/14/2017 7:00 AM Davon Little MD [facility]'s Diagnoses     Type 2 diabetes mellitus with retinopathy without macular edema, with long-term current use of insulin, unspecified laterality, unspecified retinopathy severity (H)    -  1    Hyperlipidemia LDL goal <100        Screen for colon cancer        Erectile dysfunction, unspecified erectile dysfunction type        Screening for prostate cancer          Care Instructions    Decrease metformin to 500 mg 2x daily    Adjust lantus as needed    Keep working on healthy diet/exercise     We will send you lab results    Return the FIT ( stool ) test               Follow-ups after your visit        Additional Services     DIABETES EDUCATOR REFERRAL       DIABETES SELF MANAGEMENT TRAINING (DSMT)      Your provider has referred you to Diabetes Education: [facility]: Diabetes Education - All [facility] [phone]   [email or web address]     If an urgent visit is needed or A1C is above 12, Care Team to call the Diabetes  Education Team at [phone] or send an In Basket message to the Diabetes Education Pool (P DIAB ED-PATIENT CARE).    A  will call you to make your appointment. If it has been more than 3 business days since your referral was placed, please call the above phone number to schedule.    Type of training and number of hours: Previous Diagnosis: Follow-up DSMT - 2 hours.    Medicare covers: 10 hours of initial DSMT in 12 month period from the time of first visit, plus 2 hours of follow-up DSMT annually, and additional hours as requested for insulin training.    Diabetes Type: Type 2 - On Insulin             Diabetes Co-Morbidities: none               A1C  Goal:  <8.0       A1C is: Lab Results       Component                Value               Date                       A1C                      8.0                 04/10/2017              Diabetes Education Topics: Comprehensive Knowledge Assessment and Instruction    Special Educational Needs Requiring Individual DSMT: None       MEDICAL NUTRITION THERAPY (MNT) for Diabetes    Medical Nutrition Therapy with a Registered Dietitian can be provided in coordination with Diabetes Self-Management Training to assist in achieving optimal diabetes management.    MNT Type and Hours: Previous diagnosis: Annual follow-up MNT - 2 hours                       Medicare will cover: 3 hours initial MNT in 12 month period after first visit, plus 2 hours of follow-up MNT annually    Please be aware that coverage of these services is subject to the terms and limitations of your health insurance plan.  Call member services at your health plan to determine Diabetes Self-Management Training (Codes  &amp; ) and Medical Nutrition Therapy (Codes 60716 & 97789) benefits and ask which blood glucose monitor brands are covered by your plan.  Please bring the following with you to your appointment:    (1)  List of current medications   (2)  List of Blood Glucose Monitor brands that are covered by your insurance plan  (3)  Blood Glucose Monitor and log book  (4)   Food records for the 3 days prior to your visit    The Certified Diabetes Educator may make diabetes medication adjustments per the CDE Protocol and Collaborative Practice Agreement.                  Future tests that were ordered for you today     Open Future Orders        Priority Expected Expires Ordered    Fecal colorectal cancer screen (FIT) Routine 1/3/2018 3/7/2018 12/14/2017            Who to contact     If you have questions or need follow up information about today's clinic visit or your schedule please contact [facility] directly at  "[phone].  Normal or non-critical lab and imaging results will be communicated to you by MyChart, letter or phone within 4 business days after the clinic has received the results. If you do not hear from us within 7 days, please contact the clinic through Phone [employer] or phone. If you have a critical or abnormal lab result, we will notify you by phone as soon as possible.  Submit refill requests through [employer] or call your pharmacy and they will forward the refill request to us. Please allow 3 business days for your refill to be completed.          Additional Information About Your Visit        Phone [employer] Information     [employer] lets you send messages to your doctor, view your test results, renew your prescriptions, schedule appointments and more. To sign up, go to [email or web address] . Click on \"Log in\" on the left side of the screen, which will take you to the Welcome page. Then click on \"Sign up Now\" on the right side of the page.     You will be asked to enter the access code listed below, as well as some personal information. Please follow the directions to create your username and password.     Your access code is: [email or web address]  Expires: 3/14/2018  7:26 AM     Your access code will  in 90 days. If you need help or a new code, please call your [facility] clinic or [phone].        Care EveryWhere ID     This is your Care EveryWhere ID. This could be used by other organizations to access your [facility] medical records  [id number]        Your Vitals Were     Pulse Temperature Pulse Oximetry BMI (Body Mass Index)          68 97.5  F (36.4  C) (Oral) 100% 24.02 kg/m2         Blood Pressure from Last 3 Encounters:   17 108/66   17 120/74   04/10/17 123/75    Weight from Last 3 Encounters:   17 140 lb (63.5 kg)   17 142 lb (64.4 kg)   04/10/17 151 lb (68.5 kg)              We Performed the Following     Comprehensive metabolic panel     DIABETES EDUCATOR REFERRAL     Hemoglobin A1c  "    Lipid panel reflex to direct LDL Fasting     Prostate spec antigen screen     TSH with free T4 reflex          Today's Medication Changes          These changes are accurate as of: 12/14/17  7:26 AM.  If you have any questions, ask your nurse or doctor.               These medicines have changed or have updated prescriptions.        Dose/Directions    blood glucose monitoring meter device kit   Commonly known as:  no brand specified   This may have changed:  Another medication with the same name was removed. Continue taking this medication, and follow the directions you see here.   Used for:  Type 2 diabetes mellitus without complication, with long-term current use of insulin (H)   Changed by:  Davon Little MD        Use to test blood sugar 3 times daily or as directed.  Needs new meter and then also include all  Needed strips, lancets, and other supplies, 3 months worth, refills for a year   Quantity:  1 kit   Refills:  11       insulin glargine 100 UNIT/ML injection   Commonly known as:  LANTUS   This may have changed:  additional instructions   Used for:  Type 2 diabetes mellitus with retinopathy without macular edema, with long-term current use of insulin, unspecified laterality, unspecified retinopathy severity (H)   Changed by:  Davon Little MD        40 units at bedtime or as directed   Quantity:  20 mL   Refills:  3       insulin pen needle 31G X 8 MM   This may have changed:  when to take this   Used for:  Type 2 diabetes mellitus with retinopathy without macular edema, with long-term current use of insulin, unspecified laterality, unspecified retinopathy severity (H)   Changed by:  Davon Little MD        Dose:  1 Device   1 Device daily   Quantity:  100 each   Refills:  11       metFORMIN 500 MG tablet   Commonly known as:  GLUCOPHAGE   This may have changed:  See the new instructions.   Used for:  Type 2 diabetes mellitus with retinopathy without macular edema, with long-term current use  of insulin, unspecified laterality, unspecified retinopathy severity (H)   Changed by:  Davon Little MD        Dose:  500 mg   Take 1 tablet (500 mg) by mouth daily (with dinner)   Quantity:  180 tablet   Refills:  3       rosuvastatin 40 MG tablet   Commonly known as:  CRESTOR   This may have changed:  See the new instructions.   Used for:  Hyperlipidemia LDL goal <100   Changed by:  Davon Little MD        TAKE 1 TABLET (40 MG) BY MOUTH DAILY   Quantity:  90 tablet   Refills:  3       sildenafil 100 MG tablet   Commonly known as:  VIAGRA   This may have changed:  reasons to take this   Used for:  Erectile dysfunction, unspecified erectile dysfunction type   Changed by:  Davon Little MD        Dose:   mg   Take 0.5-1 tablets ( mg) by mouth daily as needed Take 30 min to 4 hours before intercourse.  Never use with nitroglycerin, terazosin or doxazosin.   Quantity:  6 tablet   Refills:  3            Where to get your medicines      These medications were sent to [facility]/pharmacy #[id number] - [place], [place] - [address] AT [address] OF [address] [place] [id number]     Phone:  [phone]     blood glucose monitoring lancets    blood glucose monitoring test strip    insulin glargine 100 UNIT/ML injection    insulin pen needle 31G X 8 MM    metFORMIN 500 MG tablet    rosuvastatin 40 MG tablet         Some of these will need a paper prescription and others can be bought over the counter.  Ask your nurse if you have questions.     Bring a paper prescription for each of these medications     sildenafil 100 MG tablet                Primary Care Provider Office Phone # Fax #    Davon Little [phone] [address] [facility] [address]        Equal Access to Services     [facility]: Candelaria Delong, washira luanderson, qaybta guadalupe daniel. So [facility] [phone].    ATENCIÓN: Shakeel don  español, tiene a watson disposición servicios gratuitos de asistencia lingüística. Dilcia samson [phone].    We comply with applicable federal civil rights laws and [place] laws. We do not discriminate on the basis of race, color, national origin, age, disability, sex, sexual orientation, or gender identity.            Thank you!     Thank you for choosing [facility]  for your care. Our goal is always to provide you with excellent care. Hearing back from our patients is one way we can continue to improve our services. Please take a few minutes to complete the written survey that you may receive in the mail after your visit with us. Thank you!             Your Updated Medication List - Protect others around you: Learn how to safely use, store and throw away your medicines at www.disposemymeds.org.          This list is accurate as of: 12/14/17  7:26 AM.  Always use your most recent med list.                   Brand Name Dispense Instructions for use Diagnosis    ACE/ARB/ARNI NOT PRESCRIBED (INTENTIONAL)      1 each continuous prn. ACE & ARB not prescribed due to Other: blood pressure fine, no microalbuminuria    Type 2 diabetes, HbA1C goal < 8% (H)       aspirin 81 MG tablet     100 Tab    ONE DAILY    Diabetes mellitus, type 2 (H)       blood glucose monitoring lancets     100 each    1 each 3 times daily    Type 2 diabetes mellitus with retinopathy without macular edema, with long-term current use of insulin, unspecified laterality, unspecified retinopathy severity (H)       blood glucose monitoring meter device kit    no brand specified    1 kit    Use to test blood sugar 3 times daily or as directed.  Needs new meter and then also include all  Needed strips, lancets, and other supplies, 3 months worth, refills for a year    Type 2 diabetes mellitus without complication, with long-term current use of insulin (H)       * blood glucose monitoring test strip    no brand specified    3 Month     Use to test blood sugar 3 times daily (Contour NEXT Test Strips).    Type 2 diabetes mellitus without complication (H)       * blood glucose monitoring test strip    SHRADDHA CONTOUR NEXT    100 each    Please note needs Contour NEXT  test stripsby In Vitro route 3 times daily. Please note needs Contour NEXT  test strips    Type 2 diabetes mellitus with retinopathy without macular edema, with long-term current use of insulin, unspecified laterality, unspecified retinopathy severity (H)       insulin glargine 100 UNIT/ML injection    LANTUS    20 mL    40 units at bedtime or as directed    Type 2 diabetes mellitus with retinopathy without macular edema, with long-term current use of insulin, unspecified laterality, unspecified retinopathy severity (H)       insulin pen needle 31G X 8 MM     100 each    1 Device daily    Type 2 diabetes mellitus with retinopathy without macular edema, with long-term current use of insulin, unspecified laterality, unspecified retinopathy severity (H)       metFORMIN 500 MG tablet    GLUCOPHAGE    180 tablet    Take 1 tablet (500 mg) by mouth daily (with dinner)    Type 2 diabetes mellitus with retinopathy without macular edema, with long-term current use of insulin, unspecified laterality, unspecified retinopathy severity (H)       rosuvastatin 40 MG tablet    CRESTOR    90 tablet    TAKE 1 TABLET (40 MG) BY MOUTH DAILY    Hyperlipidemia LDL goal <100       sildenafil 100 MG tablet    VIAGRA    6 tablet    Take 0.5-1 tablets ( mg) by mouth daily as needed Take 30 min to 4 hours before intercourse.  Never use with nitroglycerin, terazosin or doxazosin.    Erectile dysfunction, unspecified erectile dysfunction type       * Notice:  This list has 2 medication(s) that are the same as other medications prescribed for you. Read the directions carefully, and ask your doctor or other care provider to review them with you.

## 2017-12-14 NOTE — PROGRESS NOTES
SUBJECTIVE:   Alfred Koenig is a 52 year old male who presents to clinic today for the following health issues:       Diabetes Follow-up    Patient is checking blood sugars: once daily.  Results are as follows:       am - 110-120        Diabetic concerns: None     Symptoms of hypoglycemia (low blood sugar): none     Paresthesias (numbness or burning in feet) or sores: No     Date of last diabetic eye exam: 03/2017  BP Readings from Last 2 Encounters:   07/18/17 120/74   04/10/17 123/75     Hemoglobin A1C (%)   Date Value   04/10/2017 8.0 (H)   12/06/2016 8.3 (H)     LDL Cholesterol Calculated (mg/dL)   Date Value   04/10/2017 63   12/06/2016 112 (H)         Amount of exercise or physical activity: 6-7 days/week for an average of 30-45 minutes    Problems taking medications regularly: No    Medication side effects: none    Diet: regular (no restrictions)        none    Problem list and histories reviewed & adjusted, as indicated.  Additional history: as documented         Reviewed and updated as needed this visit by clinical staffTobacco  Allergies  Meds  Med Hx  Surg Hx  Fam Hx  Soc Hx      Reviewed and updated as needed this visit by Provider          no problems/ concerns    Feeling well    Run and walk for exercise    About 20 units of lantus nightly    Low 100s on sugars       Fairly active job    Physical Exam   Constitutional: He is oriented to person, place, and time and well-developed, well-nourished, and in no distress. No distress.   HENT:   Head: Normocephalic and atraumatic.   Eyes: Conjunctivae are normal.   Neck: Carotid bruit is not present.   Cardiovascular: Normal rate, regular rhythm, normal heart sounds and intact distal pulses.  Exam reveals no gallop and no friction rub.    No murmur heard.  Pulmonary/Chest: Effort normal and breath sounds normal. No respiratory distress. He has no wheezes. He has no rales.   Musculoskeletal: He exhibits no edema.   Neurological: He is alert and  oriented to person, place, and time.   Skin: He is not diaphoretic.   Psychiatric: Mood and affect normal.     Foot exam normal bilaterally      ASSESSMENT / PLAN:  (E11.319,  Z79.4) Type 2 diabetes mellitus with retinopathy without macular edema, with long-term current use of insulin, unspecified laterality, unspecified retinopathy severity (H)  (primary encounter diagnosis)  Comment: needs refills  Plan: DIABETES EDUCATOR REFERRAL, insulin glargine         (LANTUS) 100 UNIT/ML injection, metFORMIN         (GLUCOPHAGE) 500 MG tablet, insulin pen needle         31G X 8 MM, blood glucose monitoring (SHRADDHA         MICROLET) lancets, blood glucose monitoring         ([employer] CONTOUR NEXT) test strip, Hemoglobin         A1c, TSH with free T4 reflex        Check labs.  Patient did want to go down to 500 mg metformin.     (E78.5) Hyperlipidemia LDL goal <100  Comment: check labs fasting. Refill med.   Plan: rosuvastatin (CRESTOR) 40 MG tablet, Lipid         panel reflex to direct LDL Fasting,         Comprehensive metabolic panel             (Z12.11) Screen for colon cancer  Comment: fit test   Plan: Fecal colorectal cancer screen (FIT)             (N52.9) Erectile dysfunction, unspecified erectile dysfunction type  Comment: printed prescription, patient can take to pharmacy when needed   Plan: sildenafil (VIAGRA) 100 MG tablet             (Z12.5) Screening for prostate cancer  Comment: psa   Plan: Prostate spec antigen screen               I reviewed the patient's medications, allergies, medical history, family history, and social history.    Davon Little MD

## 2017-12-14 NOTE — LETTER
[facility]  [address] [place]  [address]  [phone]        December 18, 2017    Alfred Koenig  [address]  [facility] [address]        Dear Alfred,    The cholesterol is high.  Stay on same medications and keep working on healthy diet/exercise.     Other labs are okay.    Results for orders placed or performed in visit on 12/14/17   Lipid panel reflex to direct LDL Fasting   Result Value Ref Range    Cholesterol 228 (H) <200 mg/dL    Triglycerides 142 <150 mg/dL    HDL Cholesterol 59 >39 mg/dL    LDL Cholesterol Calculated 141 (H) <100 mg/dL    Non HDL Cholesterol 169 (H) <130 mg/dL   Comprehensive metabolic panel   Result Value Ref Range    Sodium 136 133 - 144 mmol/L    Potassium 4.2 3.4 - 5.3 mmol/L    Chloride 101 94 - 109 mmol/L    Carbon Dioxide 27 20 - 32 mmol/L    Anion Gap 8 3 - 14 mmol/L    Glucose 171 (H) 70 - 99 mg/dL    Urea Nitrogen 23 7 - 30 mg/dL    Creatinine 0.93 0.66 - 1.25 mg/dL    GFR Estimate 84 >60 mL/min/1.7m2    GFR Estimate If Black >90 >60 mL/min/1.7m2    Calcium 8.6 8.5 - 10.1 mg/dL    Bilirubin Total 0.4 0.2 - 1.3 mg/dL    Albumin 3.8 3.4 - 5.0 g/dL    Protein Total 7.3 6.8 - 8.8 g/dL    Alkaline Phosphatase 79 40 - 150 U/L    ALT 34 0 - 70 U/L    AST 24 0 - 45 U/L   Hemoglobin A1c   Result Value Ref Range    Hemoglobin A1C 7.7 (H) 4.3 - 6.0 %   TSH with free T4 reflex   Result Value Ref Range    TSH 2.26 0.40 - 4.00 mU/L   Prostate spec antigen screen   Result Value Ref Range    PSA 1.23 0 - 4 ug/L       If you have any questions please call the clinic at [phone].    Sincerely,    Davon OWENSL

## 2017-12-17 NOTE — PROGRESS NOTES
The cholesterol is high.  Stay on same medications and keep working on healthy diet/exercise.    Other labs are okay.    Davon Little MD

## 2017-12-28 ENCOUNTER — TELEPHONE (OUTPATIENT)
Dept: FAMILY MEDICINE | Facility: CLINIC | Age: 52
End: 2017-12-28

## 2017-12-28 DIAGNOSIS — Z79.4 TYPE 2 DIABETES MELLITUS WITH RETINOPATHY WITHOUT MACULAR EDEMA, WITH LONG-TERM CURRENT USE OF INSULIN, UNSPECIFIED LATERALITY, UNSPECIFIED RETINOPATHY SEVERITY (H): ICD-10-CM

## 2017-12-28 DIAGNOSIS — E11.319 TYPE 2 DIABETES MELLITUS WITH RETINOPATHY WITHOUT MACULAR EDEMA, WITH LONG-TERM CURRENT USE OF INSULIN, UNSPECIFIED LATERALITY, UNSPECIFIED RETINOPATHY SEVERITY (H): ICD-10-CM

## 2017-12-28 NOTE — TELEPHONE ENCOUNTER
Routing to PCP to review and advise.    Nurse sees OV 12/14/2017  AVS   Care Instructions    Decrease metformin to 500 mg 2x daily    Liana Smith RN  [facility]

## 2018-01-10 PROCEDURE — 82274 ASSAY TEST FOR BLOOD FECAL: CPT | Performed by: FAMILY MEDICINE

## 2018-01-12 DIAGNOSIS — Z12.11 SCREEN FOR COLON CANCER: ICD-10-CM

## 2018-01-12 NOTE — LETTER
[facility]  [address] [place]  [address]  [phone]        January 15, 2018    Alfred Koenig  [address]  [facility] [address]        Dear Alfred,    Stool test was normal.  No blood detected.     Results for orders placed or performed in visit on 01/12/18   Fecal colorectal cancer screen (FIT)   Result Value Ref Range    Occult Blood Scn FIT Negative NEG^Negative       If you have any questions please call the clinic at [phone].    Sincerely,    Davon Little MD  [facility]

## 2018-01-14 LAB — HEMOCCULT STL QL IA: NEGATIVE

## 2018-02-04 DIAGNOSIS — E11.9 DIABETES MELLITUS, TYPE 2 (H): Primary | ICD-10-CM

## 2018-02-05 NOTE — TELEPHONE ENCOUNTER
Requested Prescriptions   Pending Prescriptions Disp Refills     SHRADDHA CONTOUR test strip [Pharmacy Med Name: CONTOUR TEST STRIPS] 100 strip 7    Last Written Prescription Date:  12/14/17  Last Fill Quantity: 100,  # refills: 11   Last Office Visit with [facility] provider:  12/14/17   Future Office Visit:      Sig: USE TO TEST BLOOD SUGAR 3 TIMES A DAY OR AS DIRECTED.    Diabetic Supplies Protocol Passed    2/4/2018  1:15 AM       Passed - Patient is 18 years of age or older       Passed - Patient has had appt within past 6 mos    IV to PO - Antibiotics     None

## 2018-02-16 ENCOUNTER — TELEPHONE (OUTPATIENT)
Dept: FAMILY MEDICINE | Facility: CLINIC | Age: 53
End: 2018-02-16

## 2018-02-16 DIAGNOSIS — E11.319 TYPE 2 DIABETES MELLITUS WITH RETINOPATHY WITHOUT MACULAR EDEMA, WITH LONG-TERM CURRENT USE OF INSULIN, UNSPECIFIED LATERALITY, UNSPECIFIED RETINOPATHY SEVERITY (H): ICD-10-CM

## 2018-02-16 DIAGNOSIS — Z79.4 TYPE 2 DIABETES MELLITUS WITH RETINOPATHY WITHOUT MACULAR EDEMA, WITH LONG-TERM CURRENT USE OF INSULIN, UNSPECIFIED LATERALITY, UNSPECIFIED RETINOPATHY SEVERITY (H): ICD-10-CM

## 2018-02-16 NOTE — TELEPHONE ENCOUNTER
Faxed message from pharmacy:  patient says he should be taking 2 tabs twice daily, rather than 1 tab twice daily.  If this is correct please send new RX with new directions.  Thank you.    Requested Prescriptions   Pending Prescriptions Disp Refills     metFORMIN (GLUCOPHAGE) 500 MG tablet 180 tablet 3     Sig: Take 1 tablet (500 mg) by mouth 2 times daily (with meals)    There is no refill protocol information for this order

## 2018-03-07 NOTE — TELEPHONE ENCOUNTER
Nurse sees OV 12/14/2017  AVS   Care Instructions    Decrease metformin to 500 mg 2x daily  Provider sent in new prescription on 12/28/2018 as above.    Called [facility] - spoke to Kimberly - clarified as above - explained if patient still questioning may call clinic or look on last office visit paper work.    Liana Smith RN  [facility]

## 2018-03-07 NOTE — TELEPHONE ENCOUNTER
[facility] calling. They stated the patient thinks they are supposed to take 2 tablets of Metformin 500mg daily. The prescription that was sent to the pharmacy stated take 1 tablet daily. Please call [facility] back to clarify.    Thank you  Zabrina Chatman  Patient Representative

## 2018-05-15 DIAGNOSIS — N52.9 ERECTILE DYSFUNCTION, UNSPECIFIED ERECTILE DYSFUNCTION TYPE: ICD-10-CM

## 2018-05-15 RX ORDER — SILDENAFIL 100 MG/1
TABLET, FILM COATED ORAL
Qty: 6 TABLET | Refills: 0 | Status: SHIPPED | OUTPATIENT
Start: 2018-05-15 | End: 2018-11-30

## 2018-05-15 NOTE — TELEPHONE ENCOUNTER
BP Readings from Last 3 Encounters:   12/14/17 108/66   07/18/17 120/74   04/10/17 123/75       Prescription approved per [facility] Refill Protocol.  Brittany Brito RN [facility] Triage.

## 2018-05-15 NOTE — TELEPHONE ENCOUNTER
"Requested Prescriptions   Pending Prescriptions Disp Refills     sildenafil (VIAGRA) 100 MG tablet [Pharmacy Med Name: SILDENAFIL 100 MG TABLET] 6 tablet 3    Last Written Prescription Date:  12-14-17  Last Fill Quantity: 6,  # refills: 2   Last office visit: 12/14/2017 with prescribing provider:     Future Office Visit:     Sig: TAKE 1/2 TO 1 TAB BY MOUTH DAILY AS NEEDED, USE 30 MIN TO 4 HRS BEFORE INTERCOURSE    Erectile Dysfuction Protocol Passed    5/15/2018  4:54 PM       Passed - Absence of nitrates on medication list       Passed - Absence of Alpha Blockers on Med list       Passed - Recent (12 mo) or future (30 days) visit within the authorizing provider's specialty    Patient had office visit in the last 12 months or has a visit in the next 30 days with authorizing provider or within the authorizing provider's specialty.  See \"Patient Info\" tab in inbasket, or \"Choose Columns\" in Meds & Orders section of the refill encounter.           Passed - Patient is age 18 or older          "

## 2018-06-10 DIAGNOSIS — E11.9 DIABETES MELLITUS, TYPE 2 (H): Primary | ICD-10-CM

## 2018-06-11 NOTE — TELEPHONE ENCOUNTER
"Requested Prescriptions   Pending Prescriptions Disp Refills     CONTOUR NEXT TEST test strip [Pharmacy Med Name: CONTOUR NEXT STRIPS] 300 strip 0    Last Written Prescription Date:  2/5/18  Last Fill Quantity: 100,  # refills: 7   Last office visit: 12/14/2017 with prescribing provider:     Future Office Visit:     Sig: TEST 3 TIMES A DAY    Diabetic Supplies Protocol Passed    6/10/2018 12:24 PM       Passed - Patient is 18 years of age or older       Passed - Recent (6 mo) or future (30 days) visit within the authorizing provider's specialty    Patient had office visit in the last 6 months or has a visit in the next 30 days with authorizing provider.  See \"Patient Info\" tab in inbasket, or \"Choose Columns\" in Meds & Orders section of the refill encounter.              "

## 2018-09-10 DIAGNOSIS — E11.9 DIABETES MELLITUS, TYPE 2 (H): ICD-10-CM

## 2018-09-10 NOTE — TELEPHONE ENCOUNTER
"Requested Prescriptions   Pending Prescriptions Disp Refills     CONTOUR NEXT TEST test strip [Pharmacy Med Name: CONTOUR NEXT TEST STRIP] 300 strip 0    Last Written Prescription Date:  6/11/18  Last Fill Quantity: 300,  # refills: 0   Last office visit: 12/14/2017 with prescribing provider:     Future Office Visit:     Sig: USE TO TEST 3 TIMES A DAY    Diabetic Supplies Protocol Failed    9/10/2018  1:29 AM       Failed - Recent (6 mo) or future (30 days) visit within the authorizing provider's specialty    Patient had office visit in the last 6 months or has a visit in the next 30 days with authorizing provider.  See \"Patient Info\" tab in inbasket, or \"Choose Columns\" in Meds & Orders section of the refill encounter.           Passed - Patient is 18 years of age or older          "

## 2018-11-30 ENCOUNTER — OFFICE VISIT (OUTPATIENT)
Dept: FAMILY MEDICINE | Facility: CLINIC | Age: 53
End: 2018-11-30
Payer: COMMERCIAL

## 2018-11-30 VITALS
WEIGHT: 150.13 LBS | HEIGHT: 64 IN | OXYGEN SATURATION: 99 % | BODY MASS INDEX: 25.63 KG/M2 | TEMPERATURE: 97.2 F | HEART RATE: 59 BPM | SYSTOLIC BLOOD PRESSURE: 133 MMHG | DIASTOLIC BLOOD PRESSURE: 77 MMHG

## 2018-11-30 DIAGNOSIS — Z12.5 SCREENING FOR PROSTATE CANCER: ICD-10-CM

## 2018-11-30 DIAGNOSIS — N52.9 ERECTILE DYSFUNCTION, UNSPECIFIED ERECTILE DYSFUNCTION TYPE: ICD-10-CM

## 2018-11-30 DIAGNOSIS — E11.319 TYPE 2 DIABETES MELLITUS WITH RETINOPATHY WITHOUT MACULAR EDEMA, WITH LONG-TERM CURRENT USE OF INSULIN, UNSPECIFIED LATERALITY, UNSPECIFIED RETINOPATHY SEVERITY (H): Primary | ICD-10-CM

## 2018-11-30 DIAGNOSIS — Z79.4 TYPE 2 DIABETES MELLITUS WITH RETINOPATHY WITHOUT MACULAR EDEMA, WITH LONG-TERM CURRENT USE OF INSULIN, UNSPECIFIED LATERALITY, UNSPECIFIED RETINOPATHY SEVERITY (H): Primary | ICD-10-CM

## 2018-11-30 DIAGNOSIS — R53.83 FATIGUE, UNSPECIFIED TYPE: ICD-10-CM

## 2018-11-30 DIAGNOSIS — Z12.11 SCREEN FOR COLON CANCER: ICD-10-CM

## 2018-11-30 DIAGNOSIS — E78.5 HYPERLIPIDEMIA LDL GOAL <100: ICD-10-CM

## 2018-11-30 LAB
ALBUMIN SERPL-MCNC: 3.7 G/DL (ref 3.4–5)
ALP SERPL-CCNC: 74 U/L (ref 40–150)
ALT SERPL W P-5'-P-CCNC: 23 U/L (ref 0–70)
ANION GAP SERPL CALCULATED.3IONS-SCNC: 6 MMOL/L (ref 3–14)
AST SERPL W P-5'-P-CCNC: 28 U/L (ref 0–45)
BASOPHILS # BLD AUTO: 0 10E9/L (ref 0–0.2)
BASOPHILS NFR BLD AUTO: 0.5 %
BILIRUB SERPL-MCNC: 0.5 MG/DL (ref 0.2–1.3)
BUN SERPL-MCNC: 19 MG/DL (ref 7–30)
CALCIUM SERPL-MCNC: 8.5 MG/DL (ref 8.5–10.1)
CHLORIDE SERPL-SCNC: 107 MMOL/L (ref 94–109)
CHOLEST SERPL-MCNC: 244 MG/DL
CO2 SERPL-SCNC: 28 MMOL/L (ref 20–32)
CREAT SERPL-MCNC: 0.96 MG/DL (ref 0.66–1.25)
CREAT UR-MCNC: 73 MG/DL
DIFFERENTIAL METHOD BLD: NORMAL
EOSINOPHIL # BLD AUTO: 0.3 10E9/L (ref 0–0.7)
EOSINOPHIL NFR BLD AUTO: 5.3 %
ERYTHROCYTE [DISTWIDTH] IN BLOOD BY AUTOMATED COUNT: 11.6 % (ref 10–15)
GFR SERPL CREATININE-BSD FRML MDRD: 81 ML/MIN/1.7M2
GLUCOSE SERPL-MCNC: 96 MG/DL (ref 70–99)
HBA1C MFR BLD: 8.5 % (ref 0–5.6)
HCT VFR BLD AUTO: 43.7 % (ref 40–53)
HDLC SERPL-MCNC: 50 MG/DL
HGB BLD-MCNC: 15 G/DL (ref 13.3–17.7)
LDLC SERPL CALC-MCNC: 175 MG/DL
LYMPHOCYTES # BLD AUTO: 2.6 10E9/L (ref 0.8–5.3)
LYMPHOCYTES NFR BLD AUTO: 40.9 %
MCH RBC QN AUTO: 31.8 PG (ref 26.5–33)
MCHC RBC AUTO-ENTMCNC: 34.3 G/DL (ref 31.5–36.5)
MCV RBC AUTO: 93 FL (ref 78–100)
MICROALBUMIN UR-MCNC: <5 MG/L
MICROALBUMIN/CREAT UR: NORMAL MG/G CR (ref 0–17)
MONOCYTES # BLD AUTO: 0.6 10E9/L (ref 0–1.3)
MONOCYTES NFR BLD AUTO: 8.5 %
NEUTROPHILS # BLD AUTO: 2.9 10E9/L (ref 1.6–8.3)
NEUTROPHILS NFR BLD AUTO: 44.8 %
NONHDLC SERPL-MCNC: 194 MG/DL
PLATELET # BLD AUTO: 239 10E9/L (ref 150–450)
POTASSIUM SERPL-SCNC: 3.8 MMOL/L (ref 3.4–5.3)
PROT SERPL-MCNC: 6.8 G/DL (ref 6.8–8.8)
PSA SERPL-ACNC: 1.52 UG/L (ref 0–4)
RBC # BLD AUTO: 4.71 10E12/L (ref 4.4–5.9)
SODIUM SERPL-SCNC: 141 MMOL/L (ref 133–144)
TRIGL SERPL-MCNC: 97 MG/DL
TSH SERPL DL<=0.005 MIU/L-ACNC: 2.27 MU/L (ref 0.4–4)
WBC # BLD AUTO: 6.5 10E9/L (ref 4–11)

## 2018-11-30 PROCEDURE — 80053 COMPREHEN METABOLIC PANEL: CPT | Performed by: FAMILY MEDICINE

## 2018-11-30 PROCEDURE — 99207 C FOOT EXAM  NO CHARGE: CPT | Mod: 25 | Performed by: FAMILY MEDICINE

## 2018-11-30 PROCEDURE — 85025 COMPLETE CBC W/AUTO DIFF WBC: CPT | Performed by: FAMILY MEDICINE

## 2018-11-30 PROCEDURE — G0103 PSA SCREENING: HCPCS | Performed by: FAMILY MEDICINE

## 2018-11-30 PROCEDURE — 82043 UR ALBUMIN QUANTITATIVE: CPT | Performed by: FAMILY MEDICINE

## 2018-11-30 PROCEDURE — 84443 ASSAY THYROID STIM HORMONE: CPT | Performed by: FAMILY MEDICINE

## 2018-11-30 PROCEDURE — 36415 COLL VENOUS BLD VENIPUNCTURE: CPT | Performed by: FAMILY MEDICINE

## 2018-11-30 PROCEDURE — 83036 HEMOGLOBIN GLYCOSYLATED A1C: CPT | Performed by: FAMILY MEDICINE

## 2018-11-30 PROCEDURE — 80061 LIPID PANEL: CPT | Performed by: FAMILY MEDICINE

## 2018-11-30 PROCEDURE — 99214 OFFICE O/P EST MOD 30 MIN: CPT | Performed by: FAMILY MEDICINE

## 2018-11-30 RX ORDER — ROSUVASTATIN CALCIUM 40 MG/1
TABLET, COATED ORAL
Qty: 90 TABLET | Refills: 3 | Status: SHIPPED | OUTPATIENT
Start: 2018-11-30 | End: 2019-12-05

## 2018-11-30 RX ORDER — SILDENAFIL 100 MG/1
TABLET, FILM COATED ORAL
Qty: 6 TABLET | Refills: 3 | Status: SHIPPED | OUTPATIENT
Start: 2018-11-30 | End: 2019-12-05

## 2018-11-30 ASSESSMENT — PAIN SCALES - GENERAL: PAINLEVEL: NO PAIN (0)

## 2018-11-30 NOTE — PROGRESS NOTES
SUBJECTIVE:   Alfred Koenig is a 53 year old male who presents to clinic today for the following health issues:       Diabetes Follow-up    Patient is checking blood sugars: 3 times daily.    Blood sugar testing frequency justification: Uncontrolled diabetes  Results are as follows:         Averaging 's    Diabetic concerns: None     Symptoms of hypoglycemia (low blood sugar): none     Paresthesias (numbness or burning in feet) or sores: No     Date of last diabetic eye exam: 03/2017    BP Readings from Last 2 Encounters:   12/14/17 108/66   07/18/17 120/74     Hemoglobin A1C (%)   Date Value   12/14/2017 7.7 (H)   04/10/2017 8.0 (H)     LDL Cholesterol Calculated (mg/dL)   Date Value   12/14/2017 141 (H)   04/10/2017 63       Diabetes Management Resources    Amount of exercise or physical activity: None    Problems taking medications regularly: No    Medication side effects: none    Diet: low salt and low fat/cholesterol        none    Problem list and histories reviewed & adjusted, as indicated.  Additional history: as documented         Reviewed and updated as needed this visit by clinical staff       Reviewed and updated as needed this visit by Provider          no chest pain/ breathing problems    Not much exercise    Some walking    Occasional exercise bike    Tries to eat healthy diet    40 units lantus nightly    viagra working okay    Patient testing sugars 3x daily    Needs new meter    Physical Exam   Constitutional: He is oriented to person, place, and time and well-developed, well-nourished, and in no distress. No distress.   HENT:   Head: Normocephalic and atraumatic.   Eyes: Conjunctivae are normal.   Neck: Carotid bruit is not present.   Cardiovascular: Normal rate, regular rhythm, normal heart sounds and intact distal pulses.  Exam reveals no gallop and no friction rub.    No murmur heard.  Pulmonary/Chest: Effort normal and breath sounds normal. No respiratory distress. He has no wheezes.  He has no rales.   Musculoskeletal: He exhibits no edema.   Neurological: He is alert and oriented to person, place, and time.   Skin: He is not diaphoretic.   Psychiatric: Mood and affect normal.   foot exam done bilat, normal         ASSESSMENT / PLAN:  (E11.319,  Z79.4) Type 2 diabetes mellitus with retinopathy without macular edema, with long-term current use of insulin, unspecified laterality, unspecified retinopathy severity (H)  (primary encounter diagnosis)  Comment: refilled meds.   Also did prescriptions for new meter and supplies.  He will talk with pharmacist about this.  Could see diab ed if needed.   Plan: DIABETES EDUCATOR REFERRAL, OPTOMETRY REFERRAL,        FOOT EXAM, Albumin Random Urine Quantitative         with Creat Ratio, metFORMIN (GLUCOPHAGE) 500 MG        tablet, insulin glargine (LANTUS SOLOSTAR PEN)         100 UNIT/ML pen, insulin pen needle (31G X 8         MM) 31G X 8 MM miscellaneous, Hemoglobin A1c,         blood glucose monitoring (NO BRAND SPECIFIED)         meter device kit, blood glucose monitoring (NO         BRAND SPECIFIED) test strip, blood glucose (NO         BRAND SPECIFIED) lancets standard,         DISCONTINUED: blood glucose monitoring (SHRADDHA         CONTOUR NEXT) test strip, DISCONTINUED: blood         glucose monitoring (SHRADDHA MICROLET) lancets             (E78.5) Hyperlipidemia LDL goal <100  Comment: needs refill   Plan: rosuvastatin (CRESTOR) 40 MG tablet,         Comprehensive metabolic panel, Lipid panel         reflex to direct LDL Fasting        Fasting today     (R53.83) Fatigue, unspecified type  Comment: check   Plan: TSH with free T4 reflex, CBC with platelets         differential             (Z12.5) Screening for prostate cancer  Comment: psa   Plan: Prostate spec antigen screen             (Z12.11) Screen for colon cancer  Comment: almost due for fit test so did order   Plan: Fecal colorectal cancer screen (FIT)             (N52.9) Erectile dysfunction,  unspecified erectile dysfunction type  Comment: reprinted prescription.  No side effects on this, works well.  Plan: sildenafil (VIAGRA) 100 MG tablet               I reviewed the patient's medications, allergies, medical history, family history, and social history.    Davon Little MD

## 2018-11-30 NOTE — PATIENT INSTRUCTIONS
We will send you lab results    Schedule eye doctor appointment     Keep working on healthy diet/exercise    Return the FIT test ( colon screen ) in January or so

## 2018-11-30 NOTE — LETTER
[facility]   [address] [place]  [address]  [phone]                                   December 3, 2018    Alfred Koenig  [address]  [facility] [id number]        Dear Alfred,    The overall diabetes test ( hemoglobin a1c ) is high.    Increase insulin doses as needed.  Advise you call to see the diabetes educator.      Then plan to see us in about 2-3 months.    Cholesterol is high.  Stay on the rosuvastatin.    Keep working on healthy diet/exercise.    Other labs are okay.    Results for orders placed or performed in visit on 11/30/18   Albumin Random Urine Quantitative with Creat Ratio   Result Value Ref Range    Creatinine Urine 73 mg/dL    Albumin Urine mg/L <5 mg/L    Albumin Urine mg/g Cr Unable to calculate due to low value 0 - 17 mg/g Cr   Hemoglobin A1c   Result Value Ref Range    Hemoglobin A1C 8.5 (H) 0 - 5.6 %   TSH with free T4 reflex   Result Value Ref Range    TSH 2.27 0.40 - 4.00 mU/L   Comprehensive metabolic panel   Result Value Ref Range    Sodium 141 133 - 144 mmol/L    Potassium 3.8 3.4 - 5.3 mmol/L    Chloride 107 94 - 109 mmol/L    Carbon Dioxide 28 20 - 32 mmol/L    Anion Gap 6 3 - 14 mmol/L    Glucose 96 70 - 99 mg/dL    Urea Nitrogen 19 7 - 30 mg/dL    Creatinine 0.96 0.66 - 1.25 mg/dL    GFR Estimate 81 >60 mL/min/1.7m2    GFR Estimate If Black >90 >60 mL/min/1.7m2    Calcium 8.5 8.5 - 10.1 mg/dL    Bilirubin Total 0.5 0.2 - 1.3 mg/dL    Albumin 3.7 3.4 - 5.0 g/dL    Protein Total 6.8 6.8 - 8.8 g/dL    Alkaline Phosphatase 74 40 - 150 U/L    ALT 23 0 - 70 U/L    AST 28 0 - 45 U/L   Prostate spec antigen screen   Result Value Ref Range    PSA 1.52 0 - 4 ug/L   Lipid panel reflex to direct LDL Fasting   Result Value Ref Range    Cholesterol 244 (H) <200 mg/dL    Triglycerides 97 <150 mg/dL    HDL Cholesterol 50 >39 mg/dL    LDL Cholesterol Calculated 175 (H) <100 mg/dL    Non HDL Cholesterol 194 (H) <130 mg/dL   CBC with platelets  differential   Result Value Ref Range    WBC 6.5 4.0 - 11.0 10e9/L    RBC Count 4.71 4.4 - 5.9 10e12/L    Hemoglobin 15.0 13.3 - 17.7 g/dL    Hematocrit 43.7 40.0 - 53.0 %    MCV 93 78 - 100 fl    MCH 31.8 26.5 - 33.0 pg    MCHC 34.3 31.5 - 36.5 g/dL    RDW 11.6 10.0 - 15.0 %    Platelet Count 239 150 - 450 10e9/L    Diff Method Automated Method     % Neutrophils 44.8 %    % Lymphocytes 40.9 %    % Monocytes 8.5 %    % Eosinophils 5.3 %    % Basophils 0.5 %    Absolute Neutrophil 2.9 1.6 - 8.3 10e9/L    Absolute Lymphocytes 2.6 0.8 - 5.3 10e9/L    Absolute Monocytes 0.6 0.0 - 1.3 10e9/L    Absolute Eosinophils 0.3 0.0 - 0.7 10e9/L    Absolute Basophils 0.0 0.0 - 0.2 10e9/L       If you have any questions please call the clinic at [phone]    Sincerely,    Davon Little MD  hnr

## 2018-11-30 NOTE — MR AVS SNAPSHOT
After Visit Summary   11/30/2018    Alfred Koenig    MRN: [id number]           Patient Information     Date Of Birth          1965        Visit Information        Provider Department      11/30/2018 6:40 AM Davon Little MD [facility]'s Diagnoses     Fatigue, unspecified type    -  1    Type 2 diabetes mellitus with retinopathy without macular edema, with long-term current use of insulin, unspecified laterality, unspecified retinopathy severity (H)        Hyperlipidemia LDL goal <100        Diabetes mellitus, type 2 (H)        Screening for prostate cancer        Screen for colon cancer        Erectile dysfunction, unspecified erectile dysfunction type          Care Instructions    We will send you lab results    Schedule eye doctor appointment     Keep working on healthy diet/exercise    Return the FIT test ( colon screen ) in January or so            Follow-ups after your visit        Additional Services     DIABETES EDUCATOR REFERRAL       DIABETES SELF MANAGEMENT TRAINING (DSMT)      Your provider has referred you to Diabetes Education: FMG: Diabetes Education - All [facility] [phone]   [email or web address]     If an urgent visit is needed or A1C is above 12, Care Team to call the Diabetes  Education Team at [phone] or send an In Basket message to the Diabetes Education Pool (P DIAB ED-PATIENT CARE).    A  will call you to make your appointment. If it has been more than 3 business days since your referral was placed, please call the above phone number to schedule.    Type of training and number of hours: Previous Diagnosis: Follow-up DSMT - 2 hours.    Diabetes Type: Type 2 - On Insulin   Medicare covers: 10 hours of initial DSMT in 12 month period from the time of first visit, plus 2 hours of follow-up DSMT annually, and additional hours as requested for insulin training.          Diabetes Co-Morbidities: retinopathy               A1C Goal:  <8.0       A1C is: Lab Results       Component                Value               Date                       A1C                      7.7                 12/14/2017              MEDICAL NUTRITION THERAPY (MNT) for Diabetes    Medical Nutrition Therapy with a Registered Dietitian can be provided in coordination with Diabetes Self-Management Training to assist in achieving optimal diabetes management.    MNT Type and Hours: Previous diagnosis: Annual follow-up MNT - 2 hours                       Medicare will cover: 3 hours initial MNT in 12 month period after first visit, plus 2 hours of follow-up MNT annually        Diabetes Education Topics: Comprehensive Knowledge Assessment and Instruction    Special Educational Needs Requiring Individual DSMT: None      Please be aware that coverage of these services is subject to the terms and limitations of your health insurance plan.  Call member services at your health plan to determine Diabetes Self-Management Training (Codes  and ) and Medical Nutrition Therapy (Codes 54620 and 88281) benefits and ask which blood glucose monitor brands are covered by your plan.  Please bring the following with you to your appointment:    (1)  List of current medications   (2)  List of Blood Glucose Monitor brands that are covered by your insurance plan  (3)  Blood Glucose Monitor and log book  (4)   Food records for the 3 days prior to your visit    The Certified Diabetes Educator may make diabetes medication adjustments per the CDE Protocol and Collaborative Practice Agreement.            OPTOMETRY REFERRAL       Your provider has referred you to:  FMG: [facility] [phone]    [email or web address]      Please be aware that coverage of these services is subject to the terms and limitations of your health insurance plan.  Call member services at your health plan with  "any benefit or coverage questions.      Please bring the following to your appointment:  >>   Any x-rays, CTs or MRIs which have been performed.  Contact the facility where they were done to arrange for  prior to your scheduled appointment.  Any new CT, MRI or other procedures ordered by your specialist must be performed at a [facility] facility or coordinated by your clinic's referral office.    >>   List of current medications   >>   This referral request   >>   Any documents/labs given to you for this referral                  Future tests that were ordered for you today     Open Future Orders        Priority Expected Expires Ordered    Fecal colorectal cancer screen (FIT) Routine 12/21/2018 2/22/2019 11/30/2018            Who to contact     If you have questions or need follow up information about today's clinic visit or your schedule please contact [facility] directly at [phone].  Normal or non-critical lab and imaging results will be communicated to you by MyChart, letter or phone within 4 business days after the clinic has received the results. If you do not hear from us within 7 days, please contact the clinic through MyChart or phone. If you have a critical or abnormal lab result, we will notify you by phone as soon as possible.  Submit refill requests through [employer] or call your pharmacy and they will forward the refill request to us. Please allow 3 business days for your refill to be completed.          Additional Information About Your Visit        [employer] Information     [employer] lets you send messages to your doctor, view your test results, renew your prescriptions, schedule appointments and more. To sign up, go to [email or web address] . Click on \"Log in\" on the left side of the screen, which will take you to the Welcome page. Then click on \"Sign up Now\" on the right side of the page.     You will be asked to enter the access code listed below, as well as some " "personal information. Please follow the directions to create your username and password.     Your access code is: [email or web address]  Expires: 2019  9:34 AM     Your access code will  in 90 days. If you need help or a new code, please call your [facility] clinic or [phone].        Care EveryWhere ID     This is your Care EveryWhere ID. This could be used by other organizations to access your [facility] medical records  [id number]        Your Vitals Were     Pulse Temperature Height Pulse Oximetry BMI (Body Mass Index)       59 97.2  F (36.2  C) (Oral) 5' 4\" (1.626 m) 99% 25.77 kg/m2        Blood Pressure from Last 3 Encounters:   18 133/77   17 108/66   17 120/74    Weight from Last 3 Encounters:   18 150 lb 2 oz (68.1 kg)   17 140 lb (63.5 kg)   17 142 lb (64.4 kg)              We Performed the Following     Albumin Random Urine Quantitative with Creat Ratio     CBC with platelets differential     Comprehensive metabolic panel     DIABETES EDUCATOR REFERRAL     FOOT EXAM     Hemoglobin A1c     Lipid panel reflex to direct LDL Fasting     OPTOMETRY REFERRAL     Prostate spec antigen screen     TSH with free T4 reflex          Today's Medication Changes          These changes are accurate as of 18  7:01 AM.  If you have any questions, ask your nurse or doctor.               Start taking these medicines.        Dose/Directions    blood glucose lancets standard   Commonly known as:  NO BRAND SPECIFIED   Used for:  Type 2 diabetes mellitus with retinopathy without macular edema, with long-term current use of insulin, unspecified laterality, unspecified retinopathy severity (H)   Started by:  Davon Little MD        Use to test blood sugar 3 times daily or as directed.   Quantity:  100 each   Refills:  11       blood glucose monitoring meter device kit   Commonly known as:  NO BRAND SPECIFIED   Used for:  Type 2 diabetes mellitus with retinopathy without macular edema, " with long-term current use of insulin, unspecified laterality, unspecified retinopathy severity (H)   Started by:  Davon Little MD        Use to test blood sugar 3 times daily or as directed.   Quantity:  1 kit   Refills:  0         These medicines have changed or have updated prescriptions.        Dose/Directions    blood glucose monitoring test strip   Commonly known as:  NO BRAND SPECIFIED   This may have changed:    - additional instructions  - Another medication with the same name was removed. Continue taking this medication, and follow the directions you see here.   Used for:  Type 2 diabetes mellitus with retinopathy without macular edema, with long-term current use of insulin, unspecified laterality, unspecified retinopathy severity (H)   Changed by:  Davon Little MD        Use to test blood sugars 3 times daily or as directed   Quantity:  100 strip   Refills:  11         Stop taking these medicines if you haven't already. Please contact your care team if you have questions.     blood glucose monitoring lancets   Stopped by:  Davon Little MD                Where to get your medicines      These medications were sent to [facility]/pharmacy #[id number] - [place], [place] - [address] AT [address] OF [address] [place] [id number]     Phone:  [phone]     blood glucose lancets standard    blood glucose monitoring meter device kit    blood glucose monitoring test strip    insulin glargine 100 UNIT/ML pen    insulin pen needle 31G X 8 MM miscellaneous    metFORMIN 500 MG tablet    rosuvastatin 40 MG tablet         Some of these will need a paper prescription and others can be bought over the counter.  Ask your nurse if you have questions.     Bring a paper prescription for each of these medications     sildenafil 100 MG tablet                Primary Care Provider Office Phone # Fax #    Davon Little [phone] [address] [facility] [address]         Equal Access to Services     [facility] CLARENCE : Hadii aad ku hadcarylaltagracia Ritanathaniel, wanorbertoda luqadaha, qaybta katonyguadalupe kaufman. So [facility] [phone].    ATENCIÓN: Si habla español, tiene a watson disposición servicios gratuitos de asistencia lingüística. Llame al [phone].    We comply with applicable federal civil rights laws and [place] laws. We do not discriminate on the basis of race, color, national origin, age, disability, sex, sexual orientation, or gender identity.            Thank you!     Thank you for choosing [facility]  for your care. Our goal is always to provide you with excellent care. Hearing back from our patients is one way we can continue to improve our services. Please take a few minutes to complete the written survey that you may receive in the mail after your visit with us. Thank you!             Your Updated Medication List - Protect others around you: Learn how to safely use, store and throw away your medicines at www.disposemymeds.org.          This list is accurate as of 11/30/18  7:01 AM.  Always use your most recent med list.                   Brand Name Dispense Instructions for use Diagnosis    ACE/ARB/ARNI NOT PRESCRIBED    INTENTIONAL     1 each continuous prn. ACE & ARB not prescribed due to Other: blood pressure fine, no microalbuminuria    Type 2 diabetes, HbA1C goal < 8% (H)       aspirin 81 MG tablet    ASA    100 Tab    ONE DAILY    Diabetes mellitus, type 2 (H)       blood glucose lancets standard    NO BRAND SPECIFIED    100 each    Use to test blood sugar 3 times daily or as directed.    Type 2 diabetes mellitus with retinopathy without macular edema, with long-term current use of insulin, unspecified laterality, unspecified retinopathy severity (H)       blood glucose monitoring meter device kit    NO BRAND SPECIFIED    1 kit    Use to test blood sugar 3 times daily or as directed.    Type 2 diabetes mellitus with  retinopathy without macular edema, with long-term current use of insulin, unspecified laterality, unspecified retinopathy severity (H)       blood glucose monitoring test strip    NO BRAND SPECIFIED    100 strip    Use to test blood sugars 3 times daily or as directed    Type 2 diabetes mellitus with retinopathy without macular edema, with long-term current use of insulin, unspecified laterality, unspecified retinopathy severity (H)       insulin glargine 100 UNIT/ML pen    LANTUS PEN    20 mL    40 units at bedtime or as directed    Type 2 diabetes mellitus with retinopathy without macular edema, with long-term current use of insulin, unspecified laterality, unspecified retinopathy severity (H)       insulin pen needle 31G X 8 MM miscellaneous    31G X 8 MM    100 each    1 Device daily    Type 2 diabetes mellitus with retinopathy without macular edema, with long-term current use of insulin, unspecified laterality, unspecified retinopathy severity (H)       metFORMIN 500 MG tablet    GLUCOPHAGE    180 tablet    Take 1 tablet (500 mg) by mouth 2 times daily (with meals)    Type 2 diabetes mellitus with retinopathy without macular edema, with long-term current use of insulin, unspecified laterality, unspecified retinopathy severity (H)       rosuvastatin 40 MG tablet    CRESTOR    90 tablet    TAKE 1 TABLET (40 MG) BY MOUTH DAILY    Hyperlipidemia LDL goal <100       sildenafil 100 MG tablet    VIAGRA    6 tablet    TAKE 1/2 TO 1 TAB BY MOUTH DAILY AS NEEDED, USE 30 MIN TO 4 HRS BEFORE INTERCOURSE    Erectile dysfunction, unspecified erectile dysfunction type

## 2018-12-01 NOTE — PROGRESS NOTES
The overall diabetes test ( hemoglobin a1c ) is high.    Increase insulin doses as needed.  Advise you call to see the diabetes educator.      Then plan to see us in about 2-3 months.    Cholesterol is high.  Stay on the rosuvastatin.    Keep working on healthy diet/exercise.    Other labs are okay.    Davon Little MD

## 2019-01-30 ENCOUNTER — OFFICE VISIT (OUTPATIENT)
Dept: OPTOMETRY | Facility: CLINIC | Age: 54
End: 2019-01-30
Payer: COMMERCIAL

## 2019-01-30 DIAGNOSIS — H52.02 HYPERMETROPIA, LEFT: ICD-10-CM

## 2019-01-30 DIAGNOSIS — E11.9 TYPE 2 DIABETES MELLITUS WITHOUT RETINOPATHY (H): ICD-10-CM

## 2019-01-30 DIAGNOSIS — H04.123 DRY EYES: ICD-10-CM

## 2019-01-30 DIAGNOSIS — Z01.01 ENCOUNTER FOR EXAMINATION OF EYES AND VISION WITH ABNORMAL FINDINGS: Primary | ICD-10-CM

## 2019-01-30 DIAGNOSIS — H52.4 PRESBYOPIA: ICD-10-CM

## 2019-01-30 DIAGNOSIS — H52.11 MYOPIA, RIGHT: ICD-10-CM

## 2019-01-30 PROCEDURE — 92015 DETERMINE REFRACTIVE STATE: CPT | Performed by: OPTOMETRIST

## 2019-01-30 PROCEDURE — 92014 COMPRE OPH EXAM EST PT 1/>: CPT | Performed by: OPTOMETRIST

## 2019-01-30 ASSESSMENT — REFRACTION_MANIFEST
OD_AXIS: 085
OS_CYLINDER: SPHERE
OD_ADD: +2.00
OD_CYLINDER: +0.50
OD_SPHERE: -0.50
OS_SPHERE: +0.50
OS_ADD: +2.00

## 2019-01-30 ASSESSMENT — REFRACTION_WEARINGRX
OD_SPHERE: +1.75
OD_AXIS: 080
OS_ADD: +2.00
OD_SPHERE: -0.25
OS_SPHERE: PLANO
OD_ADD: +2.00
OS_CYLINDER: SPHERE
OD_CYLINDER: +0.50
OS_CYLINDER: SPHERE
OS_SPHERE: +2.00
OD_CYLINDER: +0.50
OD_AXIS: 080

## 2019-01-30 ASSESSMENT — CUP TO DISC RATIO
OS_RATIO: 0.25
OD_RATIO: 0.35

## 2019-01-30 ASSESSMENT — TONOMETRY
IOP_METHOD: APPLANATION
OS_IOP_MMHG: 15
OD_IOP_MMHG: 15

## 2019-01-30 ASSESSMENT — VISUAL ACUITY
METHOD: SNELLEN - LINEAR
OS_SC: 20/200
OD_SC: 20/20
OD_SC: 20/120
OS_SC: 20/20
OD_SC+: -1

## 2019-01-30 ASSESSMENT — CONF VISUAL FIELD
OS_NORMAL: 1
OD_NORMAL: 1

## 2019-01-30 ASSESSMENT — EXTERNAL EXAM - RIGHT EYE: OD_EXAM: NORMAL

## 2019-01-30 ASSESSMENT — EXTERNAL EXAM - LEFT EYE: OS_EXAM: NORMAL

## 2019-01-30 ASSESSMENT — SLIT LAMP EXAM - LIDS
COMMENTS: NORMAL
COMMENTS: NORMAL

## 2019-01-30 NOTE — PROGRESS NOTES
Chief Complaint   Patient presents with     Diabetic Eye Exam     Accompanied by self  Lab Results   Component Value Date    A1C 8.5 11/30/2018    A1C 7.7 12/14/2017    A1C 8.0 04/10/2017    A1C 8.3 12/06/2016    A1C 7.9 03/23/2016       Last Eye Exam: 2 years ago  Dilated Previously: Yes    What are you currently using to see?  does not use glasses or contacts    Distance Vision Acuity: Satisfied with vision    Near Vision Acuity: Not satisfied     Eye Comfort: dry mainly in the right eye  Do you use eye drops? : No  Occupation or Hobbies: [employer], [employer]     Medical, surgical and family histories reviewed and updated 1/30/2019.       OBJECTIVE: See Ophthalmology exam    ASSESSMENT:    ICD-10-CM    1. Encounter for examination of eyes and vision with abnormal findings Z01.01    2. Type 2 diabetes mellitus without retinopathy (H) E11.9    3. Dry eyes H04.123    4. Myopia, right H52.11    5. 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 Presbyopia H52.4       PLAN:    Alfred Koenig aware  eye exam results will be sent to Davon Little  Patient Instructions    DRY EYE TREATMENT    I recommend using artificial tears for your dry eye. There are over the counter drops that work well and may be used up to 4x daily. ( systane balance, refresh optive, soothe xp)   If you need more than 4 drops daily, use a preservative free product which come in individual vials which may be used for 24 hours and discarded.     Artificial tears work best as a preventative and not as well after your eyes are starting to bother you.  It may take 4- 6 weeks of using the drops before you notice improvement.  If after that time you are still having problems schedule an appointment for an evaluation and discussion of different treatments.  Dry eyes are a chronic condition and you may have more symptoms at certain times of the year.      Additional recommended treatment:  Warm compresses once to twice daily for 5-10  minutes    Directions for warm soaks  There are few methods for hot compresses. Moisten a washcloth with hot water, or microwave for 10 seconds, being careful to not get the cloth too hot.   Then put the washcloth onto your eyelids for 5 minutes. It will cool quickly so a rice pack or eyemask that can be heated and laid on top of the washcloth will help retain the heat.     Patient educated on importance of good blood sugar control.  Letter sent to primary care provider with diabetic eye exam report.     Alfred was advised of today's exam findings.  Optional to fill new glasses prescription, mild glasses prescription   Okay to use over the counter reading glasses- recommend +1.50 or +1.75 power.   Copy of glasses Rx provided today.  Return in 1 year for diabetic eye exam, or sooner if needed.    The affects of the dilating drops last for 4- 6 hours.  You will be more sensitive to light and vision will be blurry up close.  Mydriatic sunglasses were given if needed.      Gianfranco Kaye O.D.  [facility]  [address] [place] [address]. [place]ON[place] Rao  [id number]    [phone]

## 2019-01-30 NOTE — LETTER
1/30/2019         RE: Alfred Koenig  [address]  [place]. \[facility]"" [id number]        Dear Colleague,    Thank you for referring your patient, Alfred Koenig, to the [facility]. Please see a copy of my visit note below.    Chief Complaint   Patient presents with     Diabetic Eye Exam     Accompanied by self  Lab Results   Component Value Date    A1C 8.5 11/30/2018    A1C 7.7 12/14/2017    A1C 8.0 04/10/2017    A1C 8.3 12/06/2016    A1C 7.9 03/23/2016       Last Eye Exam: 2 years ago  Dilated Previously: Yes    What are you currently using to see?  does not use glasses or contacts    Distance Vision Acuity: Satisfied with vision    Near Vision Acuity: Not satisfied     Eye Comfort: dry mainly in the right eye  Do you use eye drops? : No  Occupation or Hobbies: [employer]     Medical, surgical and family histories reviewed and updated 1/30/2019.       OBJECTIVE: See Ophthalmology exam    ASSESSMENT:    ICD-10-CM    1. Encounter for examination of eyes and vision with abnormal findings Z01.01    2. Type 2 diabetes mellitus without retinopathy (H) E11.9    3. Dry eyes H04.123    4. Myopia, right H52.11    5. 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 Presbyopia H52.4       PLAN:    Alfred Koenig aware  eye exam results will be sent to Davon Little  Patient Instructions    DRY EYE TREATMENT    I recommend using artificial tears for your dry eye. There are over the counter drops that work well and may be used up to 4x daily. ( systane balance, refresh optive, soothe xp)   If you need more than 4 drops daily, use a preservative free product which come in individual vials which may be used for 24 hours and discarded.     Artificial tears work best as a preventative and not as well after your eyes are starting to bother you.  It may take 4- 6 weeks of using the drops before you notice improvement.  If after that time you are still having problems schedule an  appointment for an evaluation and discussion of different treatments.  Dry eyes are a chronic condition and you may have more symptoms at certain times of the year.      Additional recommended treatment:  Warm compresses once to twice daily for 5-10 minutes    Directions for warm soaks  There are few methods for hot compresses. Moisten a washcloth with hot water, or microwave for 10 seconds, being careful to not get the cloth too hot.   Then put the washcloth onto your eyelids for 5 minutes. It will cool quickly so a rice pack or eyemask that can be heated and laid on top of the washcloth will help retain the heat.     Patient educated on importance of good blood sugar control.  Letter sent to primary care provider with diabetic eye exam report.     Alfred was advised of today's exam findings.  Optional to fill new glasses prescription, mild glasses prescription   Okay to use over the counter reading glasses- recommend +1.50 or +1.75 power.   Copy of glasses Rx provided today.  Return in 1 year for diabetic eye exam, or sooner if needed.    The affects of the dilating drops last for 4- 6 hours.  You will be more sensitive to light and vision will be blurry up close.  Mydriatic sunglasses were given if needed.      Gianfranco Kaye O.D.  [address]. [place]  [address]    [phone]               Again, thank you for allowing me to participate in the care of your patient.        Sincerely,        Gianfranco Kaye, OD

## 2019-01-30 NOTE — PATIENT INSTRUCTIONS
DRY EYE TREATMENT    I recommend using artificial tears for your dry eye. There are over the counter drops that work well and may be used up to 4x daily. ( systane balance, refresh optive, soothe xp)   If you need more than 4 drops daily, use a preservative free product which come in individual vials which may be used for 24 hours and discarded.     Artificial tears work best as a preventative and not as well after your eyes are starting to bother you.  It may take 4- 6 weeks of using the drops before you notice improvement.  If after that time you are still having problems schedule an appointment for an evaluation and discussion of different treatments.  Dry eyes are a chronic condition and you may have more symptoms at certain times of the year.      Additional recommended treatment:  Warm compresses once to twice daily for 5-10 minutes    Directions for warm soaks  There are few methods for hot compresses. Moisten a washcloth with hot water, or microwave for 10 seconds, being careful to not get the cloth too hot.   Then put the washcloth onto your eyelids for 5 minutes. It will cool quickly so a rice pack or eyemask that can be heated and laid on top of the washcloth will help retain the heat.     Patient educated on importance of good blood sugar control.  Letter sent to primary care provider with diabetic eye exam report.     Alfred was advised of today's exam findings.  Optional to fill new glasses prescription, mild glasses prescription   Okay to use over the counter reading glasses- recommend +1.50 or +1.75 power.   Copy of glasses Rx provided today.  Return in 1 year for diabetic eye exam, or sooner if needed.    The affects of the dilating drops last for 4- 6 hours.  You will be more sensitive to light and vision will be blurry up close.  Mydriatic sunglasses were given if needed.      Gianfranco Kaye O.D.  [facility]  [address] [place] [address]. [place] Rao  [id number]    [phone]

## 2019-02-28 DIAGNOSIS — N52.9 ERECTILE DYSFUNCTION, UNSPECIFIED ERECTILE DYSFUNCTION TYPE: ICD-10-CM

## 2019-02-28 RX ORDER — SILDENAFIL 100 MG/1
TABLET, FILM COATED ORAL
Qty: 6 TABLET | Refills: 0 | Status: SHIPPED | OUTPATIENT
Start: 2019-02-28 | End: 2019-12-05

## 2019-02-28 NOTE — TELEPHONE ENCOUNTER
Prescription approved per [facility] Refill Protocol.  Lina Mayfield RN

## 2019-02-28 NOTE — TELEPHONE ENCOUNTER
"Requested Prescriptions   Pending Prescriptions Disp Refills     sildenafil (VIAGRA) 100 MG tablet [Pharmacy Med Name: SILDENAFIL 100 MG TABLET] 6 tablet 0    Last Written Prescription Date:  11/30/18  Last Fill Quantity: 6,  # refills: 3   Last office visit: 11/30/2018 with prescribing provider:     Future Office Visit:     Sig: TAKE 1/2 TO 1 TAB BY MOUTH DAILY AS NEEDED, USE 30 MIN TO 4 HRS BEFORE INTERCOURSE    Erectile Dysfuction Protocol Passed - 2/28/2019  3:19 PM       Passed - Absence of nitrates on medication list       Passed - Absence of Alpha Blockers on Med list       Passed - Recent (12 mo) or future (30 days) visit within the authorizing provider's specialty    Patient had office visit in the last 12 months or has a visit in the next 30 days with authorizing provider or within the authorizing provider's specialty.  See \"Patient Info\" tab in inbasket, or \"Choose Columns\" in Meds & Orders section of the refill encounter.             Passed - Medication is active on med list       Passed - Patient is age 18 or older          "

## 2019-03-06 ENCOUNTER — TELEPHONE (OUTPATIENT)
Dept: FAMILY MEDICINE | Facility: CLINIC | Age: 54
End: 2019-03-06

## 2019-03-06 NOTE — LETTER
March 6, 2019    Alfred Koenig  [address]  [facility] [address]    Dear Alfred    We care about your health and have reviewed your health plan. We have reviewed your medical conditions, medication list, and lab results and are making recommendations based on this review, to better manage your health.    You are in particular need of attention regarding:  - Completing a Colon Cancer Screening (FIT) - Please complete FIT test which we gave to you in November to complete and mail completed test to clinic.      Here is a list of Health Maintenance topics that are due now or due soon:  Health Maintenance Due   Topic Date Due     PREVENTIVE CARE VISIT  1965     HIV SCREEN (SYSTEM ASSIGNED)  11/13/1981     ZOSTER IMMUNIZATION (1 of 2) 11/13/2013     DIABETIC EDUCATION Q1 YEAR  01/11/2014     FIT Q1 YR  01/10/2019     We will be calling you in the next couple of weeks to help you schedule any appointments that are needed.  Please call us at [phone] (or use [facility]) to address the above recommendations.     Thank you for trusting [facility] and we appreciate the opportunity to serve you.  We look forward to supporting your healthcare needs in the future.    Healthy Regards,    Your care team

## 2019-03-06 NOTE — LETTER
April 2, 2019    Alfred Koenig  [address]  [facility] [address]      Dear Alfred Koenig,     We have tried to contact you about your health, but have been unable to reach you.  Please call us as soon as possible so we can provide you with the best care possible.  We will continue to check in with you throughout the year to complete these items of care, if you are not able to complete these items at this time.  If you would like to complete the missing items for your care, please contact us at [phone].    We recommend the following:  -complete a FIT TEST a FIT test or Fecal Immunochemical Occult Blood Test is a take home stool sample kit.  It does not replace the colonoscopy for colorectal cancer screening, but it can detect hidden bleeding in the lower colon.  It does need to be repeated every year and if a positive result is obtained, you would be referred for a colonoscopy.  If you have completed either one of these tests at another facility, please have the records sent to our clinic so that we can best coordinate your care.        Sincerely,   Your care team    Your Care Team at [place]

## 2019-04-02 NOTE — TELEPHONE ENCOUNTER
Panel Management Review      Patient has the following on his problem list:     Diabetes    ASA: Passed    Last A1C  Lab Results   Component Value Date    A1C 8.5 11/30/2018    A1C 7.7 12/14/2017    A1C 8.0 04/10/2017    A1C 8.3 12/06/2016    A1C 7.9 03/23/2016     A1C tested: MONITOR    Last LDL:    Lab Results   Component Value Date    CHOL 244 11/30/2018     Lab Results   Component Value Date    HDL 50 11/30/2018     Lab Results   Component Value Date     11/30/2018     Lab Results   Component Value Date    TRIG 97 11/30/2018     Lab Results   Component Value Date    CHOLHDLRATIO 3.5 04/14/2015     Lab Results   Component Value Date    NHDL 194 11/30/2018       Is the patient on a Statin? YES             Is the patient on Aspirin? YES    Medications     HMG CoA Reductase Inhibitors     rosuvastatin (CRESTOR) 40 MG tablet       Salicylates     ASPIRIN 81 MG PO TABS             Last three blood pressure readings:  BP Readings from Last 3 Encounters:   11/30/18 133/77   12/14/17 108/66   07/18/17 120/74       Date of last diabetes office visit: 11/30/2018     Tobacco History:     History   Smoking Status     Never Smoker   Smokeless Tobacco     Never Used           Composite cancer screening  Chart review shows that this patient is due/due soon for the following Fecal Colorectal (FIT)  Summary:    Patient is due/failing the following:   FIT    Action needed:   Patient needs referral/order: Fit test given in November    Type of outreach:    Sent letter. 03/06/2019    Questions for provider review:    None                                                                                                                                    Erica Broderick [facility]       Chart routed to Care Team .          
Final letter mailed to patient.  Erica Broderick CMA    
I called and left message for patient to call back regarding completing fit test and returning it.  Erica Broderick CMA    
Single Mechanical/Accidental Fall

## 2019-08-23 ENCOUNTER — OFFICE VISIT (OUTPATIENT)
Dept: OPHTHALMOLOGY | Facility: CLINIC | Age: 54
End: 2019-08-23
Payer: COMMERCIAL

## 2019-08-23 DIAGNOSIS — H10.31 ACUTE CONJUNCTIVITIS OF RIGHT EYE, UNSPECIFIED ACUTE CONJUNCTIVITIS TYPE: Primary | ICD-10-CM

## 2019-08-23 PROCEDURE — 92002 INTRM OPH EXAM NEW PATIENT: CPT | Performed by: STUDENT IN AN ORGANIZED HEALTH CARE EDUCATION/TRAINING PROGRAM

## 2019-08-23 RX ORDER — NEOMYCIN POLYMYXIN B SULFATES AND DEXAMETHASONE 3.5; 10000; 1 MG/ML; [USP'U]/ML; MG/ML
1 SUSPENSION/ DROPS OPHTHALMIC 4 TIMES DAILY
Qty: 5 ML | Refills: 0 | Status: SHIPPED | OUTPATIENT
Start: 2019-08-23 | End: 2020-01-13

## 2019-08-23 ASSESSMENT — SLIT LAMP EXAM - LIDS
COMMENTS: NORMAL
COMMENTS: NORMAL

## 2019-08-23 ASSESSMENT — VISUAL ACUITY
METHOD: SNELLEN - LINEAR
OS_SC: 20/20
OD_SC: 20/20

## 2019-08-23 ASSESSMENT — EXTERNAL EXAM - LEFT EYE: OS_EXAM: NORMAL

## 2019-08-23 ASSESSMENT — EXTERNAL EXAM - RIGHT EYE: OD_EXAM: NORMAL

## 2019-08-23 NOTE — PATIENT INSTRUCTIONS
Use maxitrol four times a day in the right eye for 1 week    Continue artificial tears 2-4 times per day in the right eye    Caity Giles MD  [phone]

## 2019-08-23 NOTE — PROGRESS NOTES
" Current Eye Medications:  AT's prn BE      Subjective:  PT states 2 days ago while gardening he felt mulch wood get into right eye. PT tried flushing the eye with tap water and used AT's. PT notes he feels a \"sticking sensation\" in RE. RE tearing and no redness. No vision changes.     Last eye exam was with Dr. Kaye in Jan 2019.     Objective:  See Ophthalmology Exam.      Assessment:  Alfred Koenig is a 53 year old male who presents with:   Encounter Diagnosis   Name Primary?     Acute conjunctivitis of right eye, unspecified  No foreign body visualized on exam.        Plan:  Use maxitrol four times a day in the right eye for 1 week    Continue artificial tears 2-4 times per day in the right eye    Caity Giles MD  [phone]      "

## 2019-08-23 NOTE — LETTER
"    8/23/2019         RE: Alfred Koenig  [address]  [facility] [address]        Dear Colleague,    Thank you for referring your patient, Alfred Koenig, to the [facility]. Please see a copy of my visit note below.     Current Eye Medications:  AT's prn BE      Subjective:  PT states 2 days ago while gardening he felt mulch wood get into right eye. PT tried flushing the eye with tap water and used AT's. PT notes he feels a \"sticking sensation\" in RE. RE tearing and no redness. No vision changes.     Last eye exam was with Dr. Kaye in Jan 2019.     Objective:  See Ophthalmology Exam.      Assessment:  Alfred Koenig is a 53 year old male who presents with:   Encounter Diagnosis   Name Primary?     Acute conjunctivitis of right eye, unspecified  No foreign body visualized on exam.        Plan:  Use maxitrol four times a day in the right eye for 1 week    Continue artificial tears 2-4 times per day in the right eye    Caity Giles MD  [phone]        Again, thank you for allowing me to participate in the care of your patient.        Sincerely,        Caity Giles MD    "

## 2019-12-05 ENCOUNTER — OFFICE VISIT (OUTPATIENT)
Dept: FAMILY MEDICINE | Facility: CLINIC | Age: 54
End: 2019-12-05
Payer: COMMERCIAL

## 2019-12-05 VITALS
DIASTOLIC BLOOD PRESSURE: 73 MMHG | HEIGHT: 64 IN | SYSTOLIC BLOOD PRESSURE: 126 MMHG | TEMPERATURE: 98 F | HEART RATE: 70 BPM | BODY MASS INDEX: 25.95 KG/M2 | WEIGHT: 152 LBS

## 2019-12-05 DIAGNOSIS — E11.319 TYPE 2 DIABETES MELLITUS WITH RETINOPATHY WITHOUT MACULAR EDEMA, WITH LONG-TERM CURRENT USE OF INSULIN, UNSPECIFIED LATERALITY, UNSPECIFIED RETINOPATHY SEVERITY (H): Primary | ICD-10-CM

## 2019-12-05 DIAGNOSIS — R53.83 FATIGUE, UNSPECIFIED TYPE: ICD-10-CM

## 2019-12-05 DIAGNOSIS — E78.5 HYPERLIPIDEMIA LDL GOAL <100: ICD-10-CM

## 2019-12-05 DIAGNOSIS — Z79.4 TYPE 2 DIABETES MELLITUS WITH RETINOPATHY WITHOUT MACULAR EDEMA, WITH LONG-TERM CURRENT USE OF INSULIN, UNSPECIFIED LATERALITY, UNSPECIFIED RETINOPATHY SEVERITY (H): Primary | ICD-10-CM

## 2019-12-05 DIAGNOSIS — Z91.89 PNEUMOCOCCAL VACCINATION INDICATED: ICD-10-CM

## 2019-12-05 DIAGNOSIS — Z12.11 SCREEN FOR COLON CANCER: ICD-10-CM

## 2019-12-05 DIAGNOSIS — N52.9 ERECTILE DYSFUNCTION, UNSPECIFIED ERECTILE DYSFUNCTION TYPE: ICD-10-CM

## 2019-12-05 DIAGNOSIS — Z12.5 SCREENING FOR PROSTATE CANCER: ICD-10-CM

## 2019-12-05 DIAGNOSIS — B35.1 ONYCHOMYCOSIS: ICD-10-CM

## 2019-12-05 LAB
ALBUMIN SERPL-MCNC: 3.7 G/DL (ref 3.4–5)
ALP SERPL-CCNC: 85 U/L (ref 40–150)
ALT SERPL W P-5'-P-CCNC: 23 U/L (ref 0–70)
ANION GAP SERPL CALCULATED.3IONS-SCNC: 2 MMOL/L (ref 3–14)
AST SERPL W P-5'-P-CCNC: 21 U/L (ref 0–45)
BASOPHILS # BLD AUTO: 0 10E9/L (ref 0–0.2)
BASOPHILS NFR BLD AUTO: 0.4 %
BILIRUB SERPL-MCNC: 0.5 MG/DL (ref 0.2–1.3)
BUN SERPL-MCNC: 19 MG/DL (ref 7–30)
CALCIUM SERPL-MCNC: 9.1 MG/DL (ref 8.5–10.1)
CHLORIDE SERPL-SCNC: 105 MMOL/L (ref 94–109)
CHOLEST SERPL-MCNC: 206 MG/DL
CO2 SERPL-SCNC: 29 MMOL/L (ref 20–32)
CREAT SERPL-MCNC: 0.78 MG/DL (ref 0.66–1.25)
CREAT UR-MCNC: 29 MG/DL
DIFFERENTIAL METHOD BLD: NORMAL
EOSINOPHIL # BLD AUTO: 0.3 10E9/L (ref 0–0.7)
EOSINOPHIL NFR BLD AUTO: 4.9 %
ERYTHROCYTE [DISTWIDTH] IN BLOOD BY AUTOMATED COUNT: 12 % (ref 10–15)
GFR SERPL CREATININE-BSD FRML MDRD: >90 ML/MIN/{1.73_M2}
GLUCOSE SERPL-MCNC: 172 MG/DL (ref 70–99)
HBA1C MFR BLD: 8.4 % (ref 0–5.6)
HCT VFR BLD AUTO: 42.4 % (ref 40–53)
HDLC SERPL-MCNC: 63 MG/DL
HGB BLD-MCNC: 14.2 G/DL (ref 13.3–17.7)
LDLC SERPL CALC-MCNC: 132 MG/DL
LYMPHOCYTES # BLD AUTO: 1.9 10E9/L (ref 0.8–5.3)
LYMPHOCYTES NFR BLD AUTO: 34.1 %
MCH RBC QN AUTO: 31.2 PG (ref 26.5–33)
MCHC RBC AUTO-ENTMCNC: 33.5 G/DL (ref 31.5–36.5)
MCV RBC AUTO: 93 FL (ref 78–100)
MICROALBUMIN UR-MCNC: <5 MG/L
MICROALBUMIN/CREAT UR: NORMAL MG/G CR (ref 0–17)
MONOCYTES # BLD AUTO: 0.6 10E9/L (ref 0–1.3)
MONOCYTES NFR BLD AUTO: 10.3 %
NEUTROPHILS # BLD AUTO: 2.8 10E9/L (ref 1.6–8.3)
NEUTROPHILS NFR BLD AUTO: 50.3 %
NONHDLC SERPL-MCNC: 143 MG/DL
PLATELET # BLD AUTO: 267 10E9/L (ref 150–450)
POTASSIUM SERPL-SCNC: 4.2 MMOL/L (ref 3.4–5.3)
PROT SERPL-MCNC: 7.2 G/DL (ref 6.8–8.8)
PSA SERPL-ACNC: 1.66 UG/L (ref 0–4)
RBC # BLD AUTO: 4.55 10E12/L (ref 4.4–5.9)
SODIUM SERPL-SCNC: 136 MMOL/L (ref 133–144)
TRIGL SERPL-MCNC: 56 MG/DL
TSH SERPL DL<=0.005 MIU/L-ACNC: 1.26 MU/L (ref 0.4–4)
WBC # BLD AUTO: 5.5 10E9/L (ref 4–11)

## 2019-12-05 PROCEDURE — 82043 UR ALBUMIN QUANTITATIVE: CPT | Performed by: FAMILY MEDICINE

## 2019-12-05 PROCEDURE — 99207 C FOOT EXAM  NO CHARGE: CPT | Mod: 25 | Performed by: FAMILY MEDICINE

## 2019-12-05 PROCEDURE — 90732 PPSV23 VACC 2 YRS+ SUBQ/IM: CPT | Performed by: FAMILY MEDICINE

## 2019-12-05 PROCEDURE — G0103 PSA SCREENING: HCPCS | Performed by: FAMILY MEDICINE

## 2019-12-05 PROCEDURE — 90471 IMMUNIZATION ADMIN: CPT | Performed by: FAMILY MEDICINE

## 2019-12-05 PROCEDURE — 99214 OFFICE O/P EST MOD 30 MIN: CPT | Mod: 25 | Performed by: FAMILY MEDICINE

## 2019-12-05 PROCEDURE — 83036 HEMOGLOBIN GLYCOSYLATED A1C: CPT | Performed by: FAMILY MEDICINE

## 2019-12-05 PROCEDURE — 36415 COLL VENOUS BLD VENIPUNCTURE: CPT | Performed by: FAMILY MEDICINE

## 2019-12-05 PROCEDURE — 80061 LIPID PANEL: CPT | Performed by: FAMILY MEDICINE

## 2019-12-05 PROCEDURE — 80050 GENERAL HEALTH PANEL: CPT | Performed by: FAMILY MEDICINE

## 2019-12-05 RX ORDER — ROSUVASTATIN CALCIUM 40 MG/1
TABLET, COATED ORAL
Qty: 90 TABLET | Refills: 3 | Status: SHIPPED | OUTPATIENT
Start: 2019-12-05 | End: 2020-01-13

## 2019-12-05 RX ORDER — SILDENAFIL 100 MG/1
TABLET, FILM COATED ORAL
Qty: 6 TABLET | Refills: 3 | Status: SHIPPED | OUTPATIENT
Start: 2019-12-05 | End: 2021-03-03

## 2019-12-05 ASSESSMENT — PAIN SCALES - GENERAL: PAINLEVEL: NO PAIN (0)

## 2019-12-05 ASSESSMENT — MIFFLIN-ST. JEOR: SCORE: 1430.47

## 2019-12-05 NOTE — LETTER
[facility]   [address] [place]  [address]  [phone]                                   December 6, 2019    Alfred Koenig  [address]  [facility] [address]        Dear Alfred,    The diabetes test ( hemoglobin a1c ) is still high.  Restart the metformin.  I sent in refills for you.    Cholesterol is high also. Stay on rosuvastatin.    Keep working on healthy diet/exercise.     See us in 2-3 months to recheck diabetes test.    Other labs are okay.    Results for orders placed or performed in visit on 12/05/19   Albumin Random Urine Quantitative with Creat Ratio     Status: None   Result Value Ref Range    Creatinine Urine 29 mg/dL    Albumin Urine mg/L <5 mg/L    Albumin Urine mg/g Cr Unable to calculate due to low value 0 - 17 mg/g Cr   Lipid panel reflex to direct LDL Fasting     Status: Abnormal   Result Value Ref Range    Cholesterol 206 (H) <200 mg/dL    Triglycerides 56 <150 mg/dL    HDL Cholesterol 63 >39 mg/dL    LDL Cholesterol Calculated 132 (H) <100 mg/dL    Non HDL Cholesterol 143 (H) <130 mg/dL   Comprehensive metabolic panel     Status: Abnormal   Result Value Ref Range    Sodium 136 133 - 144 mmol/L    Potassium 4.2 3.4 - 5.3 mmol/L    Chloride 105 94 - 109 mmol/L    Carbon Dioxide 29 20 - 32 mmol/L    Anion Gap 2 (L) 3 - 14 mmol/L    Glucose 172 (H) 70 - 99 mg/dL    Urea Nitrogen 19 7 - 30 mg/dL    Creatinine 0.78 0.66 - 1.25 mg/dL    GFR Estimate >90 >60 mL/min/[1.73_m2]    GFR Estimate If Black >90 >60 mL/min/[1.73_m2]    Calcium 9.1 8.5 - 10.1 mg/dL    Bilirubin Total 0.5 0.2 - 1.3 mg/dL    Albumin 3.7 3.4 - 5.0 g/dL    Protein Total 7.2 6.8 - 8.8 g/dL    Alkaline Phosphatase 85 40 - 150 U/L    ALT 23 0 - 70 U/L    AST 21 0 - 45 U/L   Hemoglobin A1c     Status: Abnormal   Result Value Ref Range    Hemoglobin A1C 8.4 (H) 0 - 5.6 %   TSH with free T4 reflex     Status: None   Result Value Ref Range    TSH 1.26 0.40 - 4.00 mU/L   CBC with  platelets differential     Status: None   Result Value Ref Range    WBC 5.5 4.0 - 11.0 10e9/L    RBC Count 4.55 4.4 - 5.9 10e12/L    Hemoglobin 14.2 13.3 - 17.7 g/dL    Hematocrit 42.4 40.0 - 53.0 %    MCV 93 78 - 100 fl    MCH 31.2 26.5 - 33.0 pg    MCHC 33.5 31.5 - 36.5 g/dL    RDW 12.0 10.0 - 15.0 %    Platelet Count 267 150 - 450 10e9/L    % Neutrophils 50.3 %    % Lymphocytes 34.1 %    % Monocytes 10.3 %    % Eosinophils 4.9 %    % Basophils 0.4 %    Absolute Neutrophil 2.8 1.6 - 8.3 10e9/L    Absolute Lymphocytes 1.9 0.8 - 5.3 10e9/L    Absolute Monocytes 0.6 0.0 - 1.3 10e9/L    Absolute Eosinophils 0.3 0.0 - 0.7 10e9/L    Absolute Basophils 0.0 0.0 - 0.2 10e9/L    Diff Method Automated Method    Prostate spec antigen screen     Status: None   Result Value Ref Range    PSA 1.66 0 - 4 ug/L       If you have any questions please call the clinic at [phone]    Sincerely,    Davon Little MD  hnr

## 2019-12-05 NOTE — PATIENT INSTRUCTIONS
Keep working on healthy diet/exercise    Try some stretching/ range of motion exercises for the back and side; follow up as needed based on symptoms     We will send you lab results    Return the FIT test

## 2019-12-05 NOTE — PROGRESS NOTES
Subjective     Alfred Koenig is a 54 year old male who presents to clinic today for the following health issues:    HPI   Diabetes Follow-up    How often are you checking your blood sugar? One time daily  What time of day are you checking your blood sugars (select all that apply)?  Before meals  Have you had any blood sugars above 200?  No  Have you had any blood sugars below 70?  No    What symptoms do you notice when your blood sugar is low?  None    What concerns do you have today about your diabetes? None     Do you have any of these symptoms? (Select all that apply)  No numbness or tingling in feet.  No redness, sores or blisters on feet.  No complaints of excessive thirst.  No reports of blurry vision.  No significant changes to weight.     Have you had a diabetic eye exam in the last 12 months? Yes- Date of last eye exam: 01/30/2019    BP Readings from Last 2 Encounters:   12/05/19 126/73   11/30/18 133/77     Hemoglobin A1C (%)   Date Value   11/30/2018 8.5 (H)   12/14/2017 7.7 (H)     LDL Cholesterol Calculated (mg/dL)   Date Value   11/30/2018 175 (H)   12/14/2017 141 (H)       Diabetes Management Resources      How many servings of fruits and vegetables do you eat daily?  0-1    On average, how many sweetened beverages do you drink each day (Examples: soda, juice, sweet tea, etc.  Do NOT count diet or artificially sweetened beverages)?   0    How many days per week do you miss taking your medication? 0                 Reviewed and updated as needed this visit by Provider         Review of Systems   ROS COMP: 180 last night   120-140s often    In am sometimes 80s or low 100s    Feeling okay physically    Some pressure left abd comes and goes    No aggrevating or alleviating factors      3 months    Maybe strained something    Does not prevent him from doing anything    Active job    No exercise outside of work     Patient has really low sugars on metformin so stopped that quite a while ago         "  Objective    /73 (BP Location: Left arm, Patient Position: Chair, Cuff Size: Adult Regular)   Pulse 70   Temp 98  F (36.7  C) (Oral)   Ht 1.626 m (5' 4\")   Wt 68.9 kg (152 lb)   BMI 26.09 kg/m    Body mass index is 26.09 kg/m .  Physical Exam  Constitutional:       Appearance: He is well-developed.   HENT:      Head: Normocephalic and atraumatic.   Eyes:      Conjunctiva/sclera: Conjunctivae normal.   Neck:      Vascular: No carotid bruit.   Cardiovascular:      Rate and Rhythm: Normal rate and regular rhythm.      Heart sounds: Normal heart sounds.   Pulmonary:      Effort: Pulmonary effort is normal. No respiratory distress.      Breath sounds: Normal breath sounds.   Neurological:      Mental Status: He is alert and oriented to person, place, and time.      Cranial Nerves: No cranial nerve deficit.   Psychiatric:         Speech: Speech normal.         Behavior: Behavior normal.      foot exam normal  Mild onychomycosis     Patient has very slight tenderness left flank over costochondral area ; no swelling  Hurt some when he bent over to right      Diagnostic Test Results:  Labs reviewed in Epic           ASSESSMENT / PLAN:  (E11.319,  Z79.4) Type 2 diabetes mellitus with retinopathy without macular edema, with long-term current use of insulin, unspecified laterality, unspecified retinopathy severity (H)  (primary encounter diagnosis)  Comment: refill meds, recheck labs.  Could restart metformin if needed  Plan: Albumin Random Urine Quantitative with Creat         Ratio, FOOT EXAM, insulin glargine (LANTUS         SOLOSTAR) 100 UNIT/ML pen, blood glucose (NO         BRAND SPECIFIED) test strip, blood glucose (NO         BRAND SPECIFIED) lancets standard, insulin pen         needle (31G X 8 MM) 31G X 8 MM miscellaneous,         Hemoglobin A1c             (N52.9) Erectile dysfunction, unspecified erectile dysfunction type  Comment: working well for patient.  Refill.   Plan: sildenafil (VIAGRA) 100 MG " tablet             (E78.5) Hyperlipidemia LDL goal <100  Comment: refill med, recheck lab fasting   Plan: rosuvastatin (CRESTOR) 40 MG tablet, Lipid         panel reflex to direct LDL Fasting,         Comprehensive metabolic panel            (Z91.89) Pneumococcal vaccination indicated  Comment: needs   Plan: PNEUMOCOCCAL VACCINE,ADULT,SQ OR IM, SCREENING         QUESTIONS FOR ADULT IMMUNIZATIONS        Given     (Z12.11) Screen for colon cancer  Comment: fit   Plan: Fecal colorectal cancer screen (FIT)             (Z12.5) Screening for prostate cancer  Comment: psa   Plan: Prostate spec antigen screen             (R53.83) Fatigue, unspecified type  Comment: check   Plan: TSH with free T4 reflex, CBC with platelets         differential             (B35.1) Onychomycosis  Comment:  Minimal; no treatment needed   Plan: as above       I reviewed the patient's medications, allergies, medical history, family history, and social history.    Davon Little MD

## 2019-12-06 DIAGNOSIS — Z79.4 TYPE 2 DIABETES MELLITUS WITHOUT COMPLICATION, WITH LONG-TERM CURRENT USE OF INSULIN (H): ICD-10-CM

## 2019-12-06 DIAGNOSIS — E11.9 TYPE 2 DIABETES MELLITUS WITHOUT COMPLICATION, WITH LONG-TERM CURRENT USE OF INSULIN (H): ICD-10-CM

## 2019-12-06 NOTE — RESULT ENCOUNTER NOTE
The diabetes test ( hemoglobin a1c ) is still high.  Restart the metformin.  I sent in refills for you.    Cholesterol is high also. Stay on rosuvastatin.    Keep working on healthy diet/exercise.     See us in 2-3 months to recheck diabetes test.    Other labs are okay.    Davon Little MD

## 2019-12-08 DIAGNOSIS — Z79.4 TYPE 2 DIABETES MELLITUS WITH RETINOPATHY WITHOUT MACULAR EDEMA, WITH LONG-TERM CURRENT USE OF INSULIN, UNSPECIFIED LATERALITY, UNSPECIFIED RETINOPATHY SEVERITY (H): ICD-10-CM

## 2019-12-08 DIAGNOSIS — E11.319 TYPE 2 DIABETES MELLITUS WITH RETINOPATHY WITHOUT MACULAR EDEMA, WITH LONG-TERM CURRENT USE OF INSULIN, UNSPECIFIED LATERALITY, UNSPECIFIED RETINOPATHY SEVERITY (H): ICD-10-CM

## 2019-12-09 NOTE — TELEPHONE ENCOUNTER
"Requested Prescriptions   Pending Prescriptions Disp Refills     insulin glargine (LANTUS SOLOSTAR) 100 UNIT/ML pen [Pharmacy Med Name: LANTUS SOLOSTAR 100 UNIT/ML]  0     Si UNITS AT BEDTIME OR AS DIRECTED   Last Written Prescription Date:  19  Last Fill Quantity: 45,  # refills: 1   Last office visit: 2019 with prescribing provider:     Future Office Visit:        Long Acting Insulin Protocol Passed - 2019  1:35 PM        Passed - Blood pressure less than 140/90 in past 6 months     BP Readings from Last 3 Encounters:   19 126/73   18 133/77   17 108/66                 Passed - LDL on file in past 12 months     Recent Labs   Lab Test 19  0718   *             Passed - Microalbumin on file in past 12 months     Recent Labs   Lab Test 19  0723   MICROL <5   UMALCR Unable to calculate due to low value             Passed - Serum creatinine on file in past 12 months     Recent Labs   Lab Test 19  0718   CR 0.78             Passed - HgbA1C in past 3 or 6 months     If HgbA1C is 8 or greater, it needs to be on file within the past 3 months.  If less than 8, must be on file within the past 6 months.     Recent Labs   Lab Test 19  0718   A1C 8.4*             Passed - Medication is active on med list        Passed - Patient is age 18 or older        Passed - Recent (6 mo) or future (30 days) visit within the authorizing provider's specialty     Patient had office visit in the last 6 months or has a visit in the next 30 days with authorizing provider or within the authorizing provider's specialty.  See \"Patient Info\" tab in inbasket, or \"Choose Columns\" in Meds & Orders section of the refill encounter.              "

## 2019-12-10 NOTE — TELEPHONE ENCOUNTER
Medication filled 12/5/19. Duplicate, refused.     Emily Wu, RN, BSN, PHN  [facility]: [place]

## 2019-12-19 DIAGNOSIS — Z12.11 SCREEN FOR COLON CANCER: ICD-10-CM

## 2019-12-19 LAB — HEMOCCULT STL QL IA: POSITIVE

## 2019-12-19 PROCEDURE — 82274 ASSAY TEST FOR BLOOD FECAL: CPT | Performed by: FAMILY MEDICINE

## 2019-12-19 NOTE — LETTER
[facility]   [address] [place]  [address]  [phone]                                   December 24, 2019    Alfred Koenig  [address]  [place] [address]        Dear Alfred,    I tried to call you with this result.    The stool test did show some blood.  We should order a colonoscopy; I will try to call you later this week again to discuss details.    Results for orders placed or performed in visit on 12/19/19   Fecal colorectal cancer screen (FIT)     Status: Abnormal   Result Value Ref Range    Occult Blood Scn FIT Positive (A) NEG^Negative       If you have any questions please call the clinic at [phone]    Sincerely,    Davon Little MD   bmd

## 2019-12-23 ENCOUNTER — TELEPHONE (OUTPATIENT)
Dept: FAMILY MEDICINE | Facility: CLINIC | Age: 54
End: 2019-12-23

## 2019-12-23 DIAGNOSIS — Z12.11 SCREEN FOR COLON CANCER: Primary | ICD-10-CM

## 2019-12-23 NOTE — RESULT ENCOUNTER NOTE
I tried to call you with this result.    The stool test did show some blood.  We should order a colonoscopy; I will try to call you later this week again to discuss details.    Davon Little MD

## 2019-12-27 NOTE — TELEPHONE ENCOUNTER
I called and discussed in detail with patient.  I gave him the number to call and schedule colonoscopy.    Davon Little MD

## 2020-01-08 ASSESSMENT — MIFFLIN-ST. JEOR: SCORE: 1423.67

## 2020-01-12 ENCOUNTER — NURSE TRIAGE (OUTPATIENT)
Dept: NURSING | Facility: CLINIC | Age: 55
End: 2020-01-12

## 2020-01-12 NOTE — TELEPHONE ENCOUNTER
"Elissa wants to know what to do today for his colonoscopy tomorrow.  Note in chart from his provider.  Read to him what a clear liquid diet is.  He understood what I told him. \"A clear liquid diet is easily digested and leaves no undigested residue in your intestinal tract. ... Foods and liquids allowed on the clear liquid diet include popsicles, clear juice without pulp, plain gelatin, ice chips, water, sweetened tea or coffee (no creamer), clear broths, carbonated beverages, and flavored water.\"  https://[email or web address]?[email or web address]=hp&ei=[email or web address]&q=clear+liquid+diet&oq=clear+liquid+diet&gs_l=psy-a  "

## 2020-01-13 ENCOUNTER — SURGERY (OUTPATIENT)
Age: 55
End: 2020-01-13
Payer: COMMERCIAL

## 2020-01-13 ENCOUNTER — HOSPITAL ENCOUNTER (OUTPATIENT)
Facility: AMBULATORY SURGERY CENTER | Age: 55
Discharge: HOME | End: 2020-01-13
Attending: INTERNAL MEDICINE | Admitting: INTERNAL MEDICINE
Payer: COMMERCIAL

## 2020-01-13 VITALS
BODY MASS INDEX: 24.49 KG/M2 | HEIGHT: 65 IN | RESPIRATION RATE: 16 BRPM | HEART RATE: 63 BPM | SYSTOLIC BLOOD PRESSURE: 123 MMHG | TEMPERATURE: 97.1 F | DIASTOLIC BLOOD PRESSURE: 78 MMHG | OXYGEN SATURATION: 98 % | WEIGHT: 147 LBS

## 2020-01-13 LAB
COLONOSCOPY: NORMAL
GLUCOSE BLDC GLUCOMTR-MCNC: 143 MG/DL (ref 70–99)

## 2020-01-13 PROCEDURE — 45385 COLONOSCOPY W/LESION REMOVAL: CPT | Mod: 33 | Performed by: INTERNAL MEDICINE

## 2020-01-13 PROCEDURE — 88305 TISSUE EXAM BY PATHOLOGIST: CPT | Performed by: INTERNAL MEDICINE

## 2020-01-13 PROCEDURE — G8907 PT DOC NO EVENTS ON DISCHARG: HCPCS

## 2020-01-13 PROCEDURE — 45385 COLONOSCOPY W/LESION REMOVAL: CPT

## 2020-01-13 PROCEDURE — G8918 PT W/O PREOP ORDER IV AB PRO: HCPCS

## 2020-01-13 RX ORDER — LIDOCAINE 40 MG/G
CREAM TOPICAL
Status: DISCONTINUED | OUTPATIENT
Start: 2020-01-13 | End: 2020-01-14 | Stop reason: HOSPADM

## 2020-01-13 RX ORDER — ONDANSETRON 2 MG/ML
4 INJECTION INTRAMUSCULAR; INTRAVENOUS
Status: DISCONTINUED | OUTPATIENT
Start: 2020-01-13 | End: 2020-01-14 | Stop reason: HOSPADM

## 2020-01-13 RX ORDER — FENTANYL CITRATE 50 UG/ML
INJECTION, SOLUTION INTRAMUSCULAR; INTRAVENOUS PRN
Status: DISCONTINUED | OUTPATIENT
Start: 2020-01-13 | End: 2020-01-13 | Stop reason: HOSPADM

## 2020-01-13 RX ORDER — ONDANSETRON 2 MG/ML
4 INJECTION INTRAMUSCULAR; INTRAVENOUS EVERY 6 HOURS PRN
Status: DISCONTINUED | OUTPATIENT
Start: 2020-01-13 | End: 2020-01-14 | Stop reason: HOSPADM

## 2020-01-13 RX ORDER — ONDANSETRON 4 MG/1
4 TABLET, ORALLY DISINTEGRATING ORAL EVERY 6 HOURS PRN
Status: DISCONTINUED | OUTPATIENT
Start: 2020-01-13 | End: 2020-01-14 | Stop reason: HOSPADM

## 2020-01-13 RX ORDER — FLUMAZENIL 0.1 MG/ML
0.2 INJECTION, SOLUTION INTRAVENOUS
Status: SHIPPED | OUTPATIENT
Start: 2020-01-13 | End: 2020-01-13

## 2020-01-13 RX ORDER — NALOXONE HYDROCHLORIDE 0.4 MG/ML
.1-.4 INJECTION, SOLUTION INTRAMUSCULAR; INTRAVENOUS; SUBCUTANEOUS
Status: DISCONTINUED | OUTPATIENT
Start: 2020-01-13 | End: 2020-01-14 | Stop reason: HOSPADM

## 2020-01-13 RX ADMIN — FENTANYL CITRATE 50 MCG: 50 INJECTION, SOLUTION INTRAMUSCULAR; INTRAVENOUS at 07:59

## 2020-01-13 RX ADMIN — FENTANYL CITRATE 25 MCG: 50 INJECTION, SOLUTION INTRAMUSCULAR; INTRAVENOUS at 08:00

## 2020-01-15 ENCOUNTER — OFFICE VISIT (OUTPATIENT)
Dept: FAMILY MEDICINE | Facility: CLINIC | Age: 55
End: 2020-01-15
Payer: COMMERCIAL

## 2020-01-15 ENCOUNTER — ANCILLARY PROCEDURE (OUTPATIENT)
Dept: GENERAL RADIOLOGY | Facility: CLINIC | Age: 55
End: 2020-01-15
Attending: FAMILY MEDICINE
Payer: COMMERCIAL

## 2020-01-15 VITALS
SYSTOLIC BLOOD PRESSURE: 143 MMHG | OXYGEN SATURATION: 99 % | BODY MASS INDEX: 25.61 KG/M2 | HEIGHT: 64 IN | HEART RATE: 58 BPM | TEMPERATURE: 97.6 F | DIASTOLIC BLOOD PRESSURE: 78 MMHG | WEIGHT: 150 LBS

## 2020-01-15 DIAGNOSIS — E11.319 TYPE 2 DIABETES MELLITUS WITH RETINOPATHY WITHOUT MACULAR EDEMA, WITH LONG-TERM CURRENT USE OF INSULIN, UNSPECIFIED LATERALITY, UNSPECIFIED RETINOPATHY SEVERITY (H): ICD-10-CM

## 2020-01-15 DIAGNOSIS — E78.5 DYSLIPIDEMIA: ICD-10-CM

## 2020-01-15 DIAGNOSIS — R07.81 RIB PAIN ON LEFT SIDE: Primary | ICD-10-CM

## 2020-01-15 DIAGNOSIS — Z79.4 TYPE 2 DIABETES MELLITUS WITH RETINOPATHY WITHOUT MACULAR EDEMA, WITH LONG-TERM CURRENT USE OF INSULIN, UNSPECIFIED LATERALITY, UNSPECIFIED RETINOPATHY SEVERITY (H): ICD-10-CM

## 2020-01-15 DIAGNOSIS — R07.81 RIB PAIN ON LEFT SIDE: ICD-10-CM

## 2020-01-15 LAB — COPATH REPORT: NORMAL

## 2020-01-15 PROCEDURE — 99214 OFFICE O/P EST MOD 30 MIN: CPT | Performed by: FAMILY MEDICINE

## 2020-01-15 PROCEDURE — 71101 X-RAY EXAM UNILAT RIBS/CHEST: CPT | Mod: LT

## 2020-01-15 RX ORDER — ROSUVASTATIN CALCIUM 5 MG/1
5 TABLET, COATED ORAL AT BEDTIME
Qty: 90 TABLET | Refills: 3 | Status: SHIPPED | OUTPATIENT
Start: 2020-01-15 | End: 2022-01-11

## 2020-01-15 ASSESSMENT — PAIN SCALES - GENERAL: PAINLEVEL: MILD PAIN (3)

## 2020-01-15 ASSESSMENT — MIFFLIN-ST. JEOR: SCORE: 1424.15

## 2020-01-15 NOTE — PROGRESS NOTES
Subjective     Alfred Koenig is a 54 year old male who presents to clinic today for the following health issues:  Patient reports he has been having stiffness to his left rib cage area for the past few months.  He reports started after he was shoveling and working outside.  He denies any direct injury or trauma.  He has no lumps no rashes.  He reports pain comes and goes.  Worse when he do physical work especially with bending and lifting.  Pain does not bother him at night does not wake him up from sleep.  He denies nausea or vomiting.  Has no fever no chills.  No cough no shortness of breath.    Patient does have history of diabetes, dyslipidemia.  He did have lab work,  beginning of December of last year.  He reports he d did not get his lab results.  However,  there was a letter mailed to him on December 6, 2019.    HPI   FLANK   PAIN     Onset: four months ago    Description:   Character: Dull ache  Location: left flank  Radiation: Back and left upper side swollen    Intensity: moderate    Progression of Symptoms:  same and intermittent    Accompanying Signs & Symptoms:  Fever/Chills?: no   Gas/Bloating: no   Nausea: no   Vomitting: no   Diarrhea?: no   Constipation:no   Dysuria or Hematuria: no    History:   Trauma: YES- possibly  Previous similar pain: no    Previous tests done: none    Precipitating factors:   Does the pain change with:     Food: no      BM: no     Urination: no     Alleviating factors:  none    Therapies Tried and outcome: none    LMP:  not applicable       Patient Active Problem List   Diagnosis     Hyperlipidemia LDL goal <100     Erectile dysfunction, unspecified erectile dysfunction type     Type 2 diabetes mellitus with background retinopathy without macular edema (H)     Type 2 diabetes mellitus with retinopathy without macular edema, with long-term current use of insulin, unspecified laterality, unspecified retinopathy severity (H)     History reviewed. No pertinent surgical  history.    Social History     Tobacco Use     Smoking status: Never Smoker     Smokeless tobacco: Never Used   Substance Use Topics     Alcohol use: Yes     Alcohol/week: 0.0 standard drinks     Comment: 6 beers per week     Family History   Problem Relation Age of Onset     Diabetes Mother      Diabetes Sister      Diabetes Brother      Diabetes Son      C.A.D. No family hx of      Cancer No family hx of      Hypertension No family hx of      Cerebrovascular Disease No family hx of      Hearing Loss No family hx of      Thyroid Disease No family hx of      Glaucoma No family hx of      Macular Degeneration No family hx of          Current Outpatient Medications   Medication Sig Dispense Refill     ACE/ARB NOT PRESCRIBED, INTENTIONAL, 1 each continuous prn. ACE & ARB not prescribed due to Other: blood pressure fine, no microalbuminuria             blood glucose (NO BRAND SPECIFIED) lancets standard Use to test blood sugar 3 times daily or as directed. 100 each 11     blood glucose (NO BRAND SPECIFIED) test strip Use to test blood sugars 3 times daily or as directed 100 strip 11     blood glucose monitoring (NO BRAND SPECIFIED) meter device kit Use to test blood sugar 3 times daily or as directed. 1 kit 0     insulin glargine (LANTUS SOLOSTAR) 100 UNIT/ML pen 45 UNITS AT BEDTIME OR AS DIRECTED 45 mL 1     insulin pen needle (31G X 8 MM) 31G X 8 MM miscellaneous 1 Device daily 100 each 11     rosuvastatin (CRESTOR) 5 MG tablet Take 1 tablet (5 mg) by mouth At Bedtime 90 tablet 3     sildenafil (VIAGRA) 100 MG tablet TAKE 1/2 TO 1 TAB BY MOUTH DAILY AS NEEDED, USE 30 MIN TO 4 HRS BEFORE INTERCOURSE 6 tablet 3     No Known Allergies    Reviewed and updated as needed this visit by Provider         Review of Systems   ROS COMP: Constitutional, HEENT, cardiovascular, pulmonary, gi and gu systems are negative, except as otherwise noted.      Objective    There were no vitals taken for this visit.  There is no height or  weight on file to calculate BMI.  Physical Exam   GENERAL: healthy, alert and no distress  NECK: no adenopathy, no asymmetry, masses, or scars and thyroid normal to palpation  RESP: lungs clear to auscultation - no rales, rhonchi or wheezes  CV: regular rate and rhythm, normal S1 S2, no S3 or S4, no murmur, click or rub, no peripheral edema and peripheral pulses strong  ABDOMEN: soft, nontender, no hepatosplenomegaly, no masses and bowel sounds normal  MS: no gross musculoskeletal defects noted, no edema  NEURO: Normal strength and tone, mentation intact and speech normal  BACK: no CVA tenderness, no paralumbar tenderness  PSYCH: mentation appears normal, affect normal/bright    Diagnostic Test Results:  Labs reviewed in Epic  CXR - negative        Assessment & Plan       ICD-10-CM    1. Rib pain on left side R07.81 XR Ribs & Chest Left G/E 3 Views   2. Type 2 diabetes mellitus with retinopathy without macular edema, with long-term current use of insulin, unspecified laterality, unspecified retinopathy severity (H) E11.319 OPTOMETRY REFERRAL    Z79.4 insulin glargine (LANTUS SOLOSTAR) 100 UNIT/ML pen   3. Dyslipidemia E78.5 rosuvastatin (CRESTOR) 5 MG tablet     #1 left-sided rib pain, his chest x-ray, rib x-ray was negative.  Reviewed the results with him.    Discussed with patient likely could be musculoskeletal.  Could be costochondritis.  He was advised with supportive care, was advised to apply ice.  Take anti-inflammatory medicine as needed.    History of diabetes I reviewed his lab results with him.  His hemoglobin A1c was at 8.4.  Advised patient with diet, exercise, weight loss.  He was advised to increase his Lantus to 45 units at bedtime.    History of dyslipidemia reviewed his lab results with him.  He was restarted back on his cholesterol at 5 mg every night.    He will follow-up with his primary care physician in 3 to 4 months.      There are no Patient Instructions on file for this visit.    Return in  about 44 months (around 9/15/2023) for Routine Visit.    Edouard Kramer MD  [facility]

## 2020-12-28 DIAGNOSIS — Z79.4 TYPE 2 DIABETES MELLITUS WITH RETINOPATHY WITHOUT MACULAR EDEMA, WITH LONG-TERM CURRENT USE OF INSULIN, UNSPECIFIED LATERALITY, UNSPECIFIED RETINOPATHY SEVERITY (H): ICD-10-CM

## 2020-12-28 DIAGNOSIS — E11.319 TYPE 2 DIABETES MELLITUS WITH RETINOPATHY WITHOUT MACULAR EDEMA, WITH LONG-TERM CURRENT USE OF INSULIN, UNSPECIFIED LATERALITY, UNSPECIFIED RETINOPATHY SEVERITY (H): ICD-10-CM

## 2020-12-30 NOTE — TELEPHONE ENCOUNTER
"Requested Prescriptions   Pending Prescriptions Disp Refills     blood glucose (NO BRAND SPECIFIED) test strip 100 strip 11     Sig: Use to test blood sugars 3 times daily or as directed       Diabetic Supplies Protocol Passed - 12/28/2020  3:45 PM        Passed - Medication is active on med list        Passed - Patient is 18 years of age or older        Passed - Recent (6 mo) or future (30 days) visit within the authorizing provider's specialty     Patient had office visit in the last 6 months or has a visit in the next 30 days with authorizing provider.  See \"Patient Info\" tab in inbasket, or \"Choose Columns\" in Meds & Orders section of the refill encounter.               Last OV was with Dr. Kramer on1/15/20.  Patient has an appointment scheduled with PCP.    Patient stated understanding and agreeable with the plan of care.   [facility]  Brittany DALTONN-RN.      "

## 2021-01-14 ENCOUNTER — OFFICE VISIT (OUTPATIENT)
Dept: FAMILY MEDICINE | Facility: CLINIC | Age: 56
End: 2021-01-14
Payer: COMMERCIAL

## 2021-01-14 VITALS
BODY MASS INDEX: 26.53 KG/M2 | TEMPERATURE: 97.3 F | DIASTOLIC BLOOD PRESSURE: 78 MMHG | HEART RATE: 64 BPM | SYSTOLIC BLOOD PRESSURE: 136 MMHG | HEIGHT: 64 IN | WEIGHT: 155.38 LBS

## 2021-01-14 DIAGNOSIS — Z23 ENCOUNTER FOR IMMUNIZATION: ICD-10-CM

## 2021-01-14 DIAGNOSIS — N52.9 ERECTILE DYSFUNCTION, UNSPECIFIED ERECTILE DYSFUNCTION TYPE: ICD-10-CM

## 2021-01-14 DIAGNOSIS — Z00.00 ROUTINE GENERAL MEDICAL EXAMINATION AT A HEALTH CARE FACILITY: Primary | ICD-10-CM

## 2021-01-14 DIAGNOSIS — E78.5 HYPERLIPIDEMIA LDL GOAL <100: ICD-10-CM

## 2021-01-14 DIAGNOSIS — R53.83 FATIGUE, UNSPECIFIED TYPE: ICD-10-CM

## 2021-01-14 DIAGNOSIS — Z12.5 SCREENING FOR PROSTATE CANCER: ICD-10-CM

## 2021-01-14 DIAGNOSIS — E11.3299 TYPE 2 DIABETES MELLITUS WITH BACKGROUND RETINOPATHY WITHOUT MACULAR EDEMA (H): ICD-10-CM

## 2021-01-14 LAB
ALBUMIN SERPL-MCNC: 4 G/DL (ref 3.4–5)
ALP SERPL-CCNC: 69 U/L (ref 40–150)
ALT SERPL W P-5'-P-CCNC: 35 U/L (ref 0–70)
ANION GAP SERPL CALCULATED.3IONS-SCNC: <1 MMOL/L (ref 3–14)
AST SERPL W P-5'-P-CCNC: 23 U/L (ref 0–45)
BASOPHILS # BLD AUTO: 0 10E9/L (ref 0–0.2)
BASOPHILS NFR BLD AUTO: 0.6 %
BILIRUB SERPL-MCNC: 0.6 MG/DL (ref 0.2–1.3)
BUN SERPL-MCNC: 17 MG/DL (ref 7–30)
CALCIUM SERPL-MCNC: 9 MG/DL (ref 8.5–10.1)
CHLORIDE SERPL-SCNC: 105 MMOL/L (ref 94–109)
CHOLEST SERPL-MCNC: 259 MG/DL
CO2 SERPL-SCNC: 34 MMOL/L (ref 20–32)
CREAT SERPL-MCNC: 0.94 MG/DL (ref 0.66–1.25)
CREAT UR-MCNC: 74 MG/DL
DIFFERENTIAL METHOD BLD: NORMAL
EOSINOPHIL # BLD AUTO: 0.3 10E9/L (ref 0–0.7)
EOSINOPHIL NFR BLD AUTO: 4.7 %
ERYTHROCYTE [DISTWIDTH] IN BLOOD BY AUTOMATED COUNT: 12.7 % (ref 10–15)
GFR SERPL CREATININE-BSD FRML MDRD: 90 ML/MIN/{1.73_M2}
GLUCOSE SERPL-MCNC: 125 MG/DL (ref 70–99)
HBA1C MFR BLD: 8.3 % (ref 0–5.6)
HCT VFR BLD AUTO: 44.9 % (ref 40–53)
HDLC SERPL-MCNC: 53 MG/DL
HGB BLD-MCNC: 15.2 G/DL (ref 13.3–17.7)
LDLC SERPL CALC-MCNC: 181 MG/DL
LYMPHOCYTES # BLD AUTO: 2.8 10E9/L (ref 0.8–5.3)
LYMPHOCYTES NFR BLD AUTO: 42.6 %
MCH RBC QN AUTO: 31.2 PG (ref 26.5–33)
MCHC RBC AUTO-ENTMCNC: 33.9 G/DL (ref 31.5–36.5)
MCV RBC AUTO: 92 FL (ref 78–100)
MICROALBUMIN UR-MCNC: <5 MG/L
MICROALBUMIN/CREAT UR: NORMAL MG/G CR (ref 0–17)
MONOCYTES # BLD AUTO: 0.7 10E9/L (ref 0–1.3)
MONOCYTES NFR BLD AUTO: 10.5 %
NEUTROPHILS # BLD AUTO: 2.7 10E9/L (ref 1.6–8.3)
NEUTROPHILS NFR BLD AUTO: 41.6 %
NONHDLC SERPL-MCNC: 206 MG/DL
PLATELET # BLD AUTO: 229 10E9/L (ref 150–450)
POTASSIUM SERPL-SCNC: 4.2 MMOL/L (ref 3.4–5.3)
PROT SERPL-MCNC: 7.6 G/DL (ref 6.8–8.8)
PSA SERPL-ACNC: 1.89 UG/L (ref 0–4)
RBC # BLD AUTO: 4.87 10E12/L (ref 4.4–5.9)
SODIUM SERPL-SCNC: 138 MMOL/L (ref 133–144)
TRIGL SERPL-MCNC: 127 MG/DL
TSH SERPL DL<=0.005 MIU/L-ACNC: 2.03 MU/L (ref 0.4–4)
WBC # BLD AUTO: 6.6 10E9/L (ref 4–11)

## 2021-01-14 PROCEDURE — 99213 OFFICE O/P EST LOW 20 MIN: CPT | Mod: 25 | Performed by: FAMILY MEDICINE

## 2021-01-14 PROCEDURE — 82043 UR ALBUMIN QUANTITATIVE: CPT | Performed by: FAMILY MEDICINE

## 2021-01-14 PROCEDURE — 80061 LIPID PANEL: CPT | Performed by: FAMILY MEDICINE

## 2021-01-14 PROCEDURE — 90714 TD VACC NO PRESV 7 YRS+ IM: CPT | Performed by: FAMILY MEDICINE

## 2021-01-14 PROCEDURE — 90471 IMMUNIZATION ADMIN: CPT | Performed by: FAMILY MEDICINE

## 2021-01-14 PROCEDURE — 80050 GENERAL HEALTH PANEL: CPT | Performed by: FAMILY MEDICINE

## 2021-01-14 PROCEDURE — G0103 PSA SCREENING: HCPCS | Performed by: FAMILY MEDICINE

## 2021-01-14 PROCEDURE — 83036 HEMOGLOBIN GLYCOSYLATED A1C: CPT | Performed by: FAMILY MEDICINE

## 2021-01-14 PROCEDURE — 99396 PREV VISIT EST AGE 40-64: CPT | Mod: 25 | Performed by: FAMILY MEDICINE

## 2021-01-14 PROCEDURE — 36415 COLL VENOUS BLD VENIPUNCTURE: CPT | Performed by: FAMILY MEDICINE

## 2021-01-14 PROCEDURE — 99207 PR FOOT EXAM NO CHARGE: CPT | Mod: 25 | Performed by: FAMILY MEDICINE

## 2021-01-14 ASSESSMENT — PAIN SCALES - GENERAL: PAINLEVEL: NO PAIN (0)

## 2021-01-14 ASSESSMENT — MIFFLIN-ST. JEOR: SCORE: 1443.53

## 2021-01-14 NOTE — LETTER
January 15, 2021      Alfred Koenig  [address] [place] [address]        Dear ,    We are writing to inform you of your test results.    The overall diabetes test ( hemoglobin a1c ) is about the same.  Ideally we would like it about 7.       I did send in a prescription for short acting insulin ( novolog ) to take with meals as needed.  This would be in addition to your long acting lantus insulin.     See the diabetes educator ( call [phone] to schedule ) and they will help you get started.     Cholesterol is quite high.  Take the rosuvastatin ( crestor ) and keep working on healthy diet/exercise.     Other labs are okay.     See us in about 3-4 months to recheck diabetes test.       Resulted Orders   Hemoglobin A1c   Result Value Ref Range    Hemoglobin A1C 8.3 (H) 0 - 5.6 %      Comment:      Normal <5.7% Prediabetes 5.7-6.4%  Diabetes 6.5% or higher - adopted from ADA   consensus guidelines.     Albumin Random Urine Quantitative with Creat Ratio   Result Value Ref Range    Creatinine Urine 74 mg/dL    Albumin Urine mg/L <5 mg/L    Albumin Urine mg/g Cr Unable to calculate due to low value 0 - 17 mg/g Cr   TSH with free T4 reflex   Result Value Ref Range    TSH 2.03 0.40 - 4.00 mU/L   Comprehensive metabolic panel   Result Value Ref Range    Sodium 138 133 - 144 mmol/L    Potassium 4.2 3.4 - 5.3 mmol/L    Chloride 105 94 - 109 mmol/L    Carbon Dioxide 34 (H) 20 - 32 mmol/L    Anion Gap <1 (L) 3 - 14 mmol/L    Glucose 125 (H) 70 - 99 mg/dL    Urea Nitrogen 17 7 - 30 mg/dL    Creatinine 0.94 0.66 - 1.25 mg/dL    GFR Estimate 90 >60 mL/min/[1.73_m2]      Comment:      Non  GFR Calc  Starting 12/18/2018, serum creatinine based estimated GFR (eGFR) will be   calculated using the Chronic Kidney Disease Epidemiology Collaboration   (CKD-EPI) equation.      GFR Estimate If Black >90 >60 mL/min/[1.73_m2]      Comment:       GFR Calc  Starting 12/18/2018,  serum creatinine based estimated GFR (eGFR) will be   calculated using the Chronic Kidney Disease Epidemiology Collaboration   (CKD-EPI) equation.      Calcium 9.0 8.5 - 10.1 mg/dL    Bilirubin Total 0.6 0.2 - 1.3 mg/dL    Albumin 4.0 3.4 - 5.0 g/dL    Protein Total 7.6 6.8 - 8.8 g/dL    Alkaline Phosphatase 69 40 - 150 U/L    ALT 35 0 - 70 U/L    AST 23 0 - 45 U/L   Lipid panel reflex to direct LDL Fasting   Result Value Ref Range    Cholesterol 259 (H) <200 mg/dL      Comment:      Desirable:       <200 mg/dl    Triglycerides 127 <150 mg/dL    HDL Cholesterol 53 >39 mg/dL    LDL Cholesterol Calculated 181 (H) <100 mg/dL      Comment:      Above desirable:  100-129 mg/dl  Borderline High:  130-159 mg/dL  High:             160-189 mg/dL  Very high:       >189 mg/dl      Non HDL Cholesterol 206 (H) <130 mg/dL      Comment:      Above Desirable:  130-159 mg/dl  Borderline high:  160-189 mg/dl  High:             190-219 mg/dl  Very high:       >219 mg/dl     Prostate spec antigen screen   Result Value Ref Range    PSA 1.89 0 - 4 ug/L      Comment:      Assay Method:  Chemiluminescence using Siemens Vista analyzer   CBC with platelets differential   Result Value Ref Range    WBC 6.6 4.0 - 11.0 10e9/L    RBC Count 4.87 4.4 - 5.9 10e12/L    Hemoglobin 15.2 13.3 - 17.7 g/dL    Hematocrit 44.9 40.0 - 53.0 %    MCV 92 78 - 100 fl    MCH 31.2 26.5 - 33.0 pg    MCHC 33.9 31.5 - 36.5 g/dL    RDW 12.7 10.0 - 15.0 %    Platelet Count 229 150 - 450 10e9/L    Diff Method Automated Method     % Neutrophils 41.6 %    % Lymphocytes 42.6 %    % Monocytes 10.5 %    % Eosinophils 4.7 %    % Basophils 0.6 %    Absolute Neutrophil 2.7 1.6 - 8.3 10e9/L    Absolute Lymphocytes 2.8 0.8 - 5.3 10e9/L    Absolute Monocytes 0.7 0.0 - 1.3 10e9/L    Absolute Eosinophils 0.3 0.0 - 0.7 10e9/L    Absolute Basophils 0.0 0.0 - 0.2 10e9/L       If you have any questions or concerns, please call the clinic at the number listed above.        Sincerely,      Davon Little MD/[place]argrove

## 2021-01-14 NOTE — PATIENT INSTRUCTIONS
We will send you lab results    If hemoglobin a1c is still  High, we can send in prescription for short acting insulin to take  With meals.  Stay on the long acting lantus daily    Could see diabetes educator    Keep working on healthy diet/exercise

## 2021-01-14 NOTE — PROGRESS NOTES
SUBJECTIVE:   CC: Alfred Koenig is an 55 year old male who presents for preventative health visit.        Patient has been advised of split billing requirements and indicates understanding: Yes  Healthy Habits:     Getting at least 3 servings of Calcium per day:  Yes    Bi-annual eye exam:  NO    Dental care twice a year:  Yes    Sleep apnea or symptoms of sleep apnea:  None    Diet:  Diabetic and Carbohydrate counting    Duration of exercise:  30-45 minutes    Taking medications regularly:  Yes    Medication side effects:  None    PHQ-2 Total Score: 0    Additional concerns today:  No                 Today's PHQ-2 Score:   PHQ-2 ( 1999 Pfizer) 1/14/2021   Q1: Little interest or pleasure in doing things 0   Q2: Feeling down, depressed or hopeless 0   PHQ-2 Score 0   Q1: Little interest or pleasure in doing things Not at all   Q2: Feeling down, depressed or hopeless Not at all   PHQ-2 Score 0       Abuse: Current or Past(Physical, Sexual or Emotional)- No  Do you feel safe in your environment? Yes        Social History     Tobacco Use     Smoking status: Never Smoker     Smokeless tobacco: Never Used   Substance Use Topics     Alcohol use: Yes     Alcohol/week: 0.0 standard drinks     Comment: 6 beers per week     If you drink alcohol do you typically have >3 drinks per day or >7 drinks per week? Yes      Alcohol Use 1/14/2021   Prescreen: >3 drinks/day or >7 drinks/week? Yes   Prescreen: >3 drinks/day or >7 drinks/week? -   AUDIT SCORE  2     AUDIT - Alcohol Use Disorders Identification Test - Reproduced from the World Health Organization Audit 2001 (Second Edition) 1/14/2021   1.  How often do you have a drink containing alcohol? 2 to 4 times a month   2.  How many drinks containing alcohol do you have on a typical day when you are drinking? 1 or 2   3.  How often do you have five or more drinks on one occasion? Never   4.  How often during the last year have you found that you were not able to stop drinking once  "you had started? Never   5.  How often during the last year have you failed to do what was normally expected of you because of drinking? Never   6.  How often during the last year have you needed a first drink in the morning to get yourself going after a heavy drinking session? Never   7.  How often during the last year have you had a feeling of guilt or remorse after drinking? Never   8.  How often during the last year have you been unable to remember what happened the night before because of your drinking? Never   9.  Have you or someone else been injured because of your drinking? No   10. Has a relative, friend, doctor or other health care worker been concerned about your drinking or suggested you cut down? No   TOTAL SCORE 2       Last PSA:   PSA   Date Value Ref Range Status   12/05/2019 1.66 0 - 4 ug/L Final     Comment:     Assay Method:  Chemiluminescence using Siemens Vista analyzer       Reviewed orders with patient. Reviewed health maintenance and updated orders accordingly - Yes      Reviewed and updated as needed this visit by clinical staff  Tobacco  Allergies  Meds   Med Hx  Surg Hx  Fam Hx  Soc Hx        Reviewed and updated as needed this visit by Provider                    Review of Systems  Running almost every day  Feels good   No concerns   no change in meds  glc a little high last couple weeks    175 avg  viagra working  Fine  Lots of sibs, most with diabetes    Kids healthy overall    1 with stomach issues  Patient stomach fine    construction    OBJECTIVE:   BP (!) 156/73 (BP Location: Right arm, Patient Position: Chair, Cuff Size: Adult Regular)   Pulse 64   Temp 97.3  F (36.3  C) (Oral)   Ht 1.63 m (5' 4.17\")   Wt 70.5 kg (155 lb 6 oz)   BMI 26.53 kg/m      Physical Exam  GENERAL: healthy, alert and no distress  EYES: Eyes grossly normal to inspection, PERRL and conjunctivae and sclerae normal  HENT: ear canals and TM's normal, nose and mouth without ulcers or lesions  NECK: no " adenopathy, no asymmetry, masses, or scars and thyroid normal to palpation  RESP: lungs clear to auscultation - no rales, rhonchi or wheezes  CV: regular rate and rhythm, normal S1 S2, no S3 or S4, no murmur, click or rub, no peripheral edema and peripheral pulses strong  ABDOMEN: soft, nontender, no hepatosplenomegaly, no masses and bowel sounds normal  MS: no gross musculoskeletal defects noted, no edema  SKIN: no suspicious lesions or rashes  NEURO: Normal strength and tone, mentation intact and speech normal  PSYCH: mentation appears normal, affect normal/bright  Diabetic foot exam: good dors pedis pulses, skin good, sensation light touch and vibration okay    Diagnostic Test Results:  Labs reviewed in Epic      ASSESSMENT/PLAN:   Alfred was seen today for physical and health maintenance.    Diagnoses and all orders for this visit:    Routine general medical examination at a health care facility    Type 2 diabetes mellitus with background retinopathy without macular edema (H)  -     Hemoglobin A1c  -     Albumin Random Urine Quantitative with Creat Ratio  -     FOOT EXAM  -     AMBULATORY ADULT DIABETES EDUCATOR REFERRAL; Future    Hyperlipidemia LDL goal <100  -     Comprehensive metabolic panel  -     Lipid panel reflex to direct LDL Fasting    Erectile dysfunction, unspecified erectile dysfunction type    Screening for prostate cancer  -     Prostate spec antigen screen    Fatigue, unspecified type  -     TSH with free T4 reflex  -     CBC with platelets differential    Encounter for immunization  -     TD PRESERV FREE, IM (7+ YRS)  -     SCREENING QUESTIONS FOR ADULT IMMUNIZATIONS      Discussed several issues with patient  Overall feeling well  Hemoglobin a1c has been moderately high last couple years.  Stay on lantus.  Patient is open to starting short acting insulin if needed.  Can send in prescription if needed and have patient see diab ed to help with dosing.  Keep working on healthy diet/exercise  "  Refilled meds recently  Td given  viagra working fine      Patient has been advised of split billing requirements and indicates understanding: Yes  COUNSELING:   Reviewed preventive health counseling, as reflected in patient instructions       Regular exercise       Healthy diet/nutrition       Vision screening       Safe sex practices/STD prevention       Colon cancer screening       Prostate cancer screening    Estimated body mass index is 26.53 kg/m  as calculated from the following:    Height as of this encounter: 1.63 m (5' 4.17\").    Weight as of this encounter: 70.5 kg (155 lb 6 oz).     Weight management plan: Discussed healthy diet and exercise guidelines    He reports that he has never smoked. He has never used smokeless tobacco.      Counseling Resources:  ATP IV Guidelines  Pooled Cohorts Equation Calculator  FRAX Risk Assessment  ICSI Preventive Guidelines  Dietary Guidelines for Americans, 2010  USDA's MyPlate  ASA Prophylaxis  Lung CA Screening    Davon Little MD  [facility]  "

## 2021-01-15 ENCOUNTER — TELEPHONE (OUTPATIENT)
Dept: FAMILY MEDICINE | Facility: CLINIC | Age: 56
End: 2021-01-15

## 2021-01-15 DIAGNOSIS — E11.319 TYPE 2 DIABETES MELLITUS WITH RETINOPATHY WITHOUT MACULAR EDEMA, WITH LONG-TERM CURRENT USE OF INSULIN, UNSPECIFIED LATERALITY, UNSPECIFIED RETINOPATHY SEVERITY (H): Primary | ICD-10-CM

## 2021-01-15 DIAGNOSIS — Z79.4 TYPE 2 DIABETES MELLITUS WITH RETINOPATHY WITHOUT MACULAR EDEMA, WITH LONG-TERM CURRENT USE OF INSULIN, UNSPECIFIED LATERALITY, UNSPECIFIED RETINOPATHY SEVERITY (H): Primary | ICD-10-CM

## 2021-01-15 NOTE — RESULT ENCOUNTER NOTE
The overall diabetes test ( hemoglobin a1c ) is about the same.  Ideally we would like it about 7.      I did send in a prescription for short acting insulin ( novolog ) to take with meals as needed.  This would be in addition to your long acting lantus insulin.    See the diabetes educator ( call [phone] to schedule ) and they will help you get started.    Cholesterol is quite high.  Take the rosuvastatin ( crestor ) and keep working on healthy diet/exercise.    Other labs are okay.    See us in about 3-4 months to recheck diabetes test.    Davon Little MD

## 2021-01-27 ENCOUNTER — PATIENT OUTREACH (OUTPATIENT)
Dept: EDUCATION SERVICES | Facility: CLINIC | Age: 56
End: 2021-01-27
Attending: FAMILY MEDICINE
Payer: COMMERCIAL

## 2021-01-27 DIAGNOSIS — E11.3299 TYPE 2 DIABETES MELLITUS WITH BACKGROUND RETINOPATHY WITHOUT MACULAR EDEMA (H): ICD-10-CM

## 2021-01-27 DIAGNOSIS — Z79.4 TYPE 2 DIABETES MELLITUS WITH RETINOPATHY WITHOUT MACULAR EDEMA, WITH LONG-TERM CURRENT USE OF INSULIN, UNSPECIFIED LATERALITY, UNSPECIFIED RETINOPATHY SEVERITY (H): Primary | ICD-10-CM

## 2021-01-27 DIAGNOSIS — E11.319 TYPE 2 DIABETES MELLITUS WITH RETINOPATHY WITHOUT MACULAR EDEMA, WITH LONG-TERM CURRENT USE OF INSULIN, UNSPECIFIED LATERALITY, UNSPECIFIED RETINOPATHY SEVERITY (H): Primary | ICD-10-CM

## 2021-01-27 PROCEDURE — G0108 DIAB MANAGE TRN  PER INDIV: HCPCS | Mod: 95

## 2021-01-27 RX ORDER — FLASH GLUCOSE SENSOR
1 KIT MISCELLANEOUS
Qty: 2 EACH | Refills: 11 | Status: SHIPPED | OUTPATIENT
Start: 2021-01-27 | End: 2021-11-29

## 2021-01-27 RX ORDER — FLASH GLUCOSE SCANNING READER
1 EACH MISCELLANEOUS ONCE
Qty: 1 EACH | Refills: 0 | Status: SHIPPED | OUTPATIENT
Start: 2021-01-27 | End: 2021-05-20

## 2021-01-27 NOTE — PROGRESS NOTES
"Diabetes Self-Management Education & Support    Presents via telephone for: Individual review    Type of Service: Telephone Visit    How would patient like to obtain AVS? Gerry    SUBJECTIVE/OBJECTIVE:  Presents for: Individual review  Accompanied by: Self  Diabetes education in the past 24mo: No  Diabetes type: Type 2  Date of diagnosis: 30 years  Difficulty affording diabetes medication?: No  Difficulty affording diabetes testing supplies?: No  Other concerns:: None  Cultural Influences/Ethnic Background:   /     Diabetes Symptoms & Complications:  Fatigue: No  Neuropathy: No  Polydipsia: No  Polyphagia: No  Polyuria: No  Visual change: Sometimes(when things are close)  Slow healing wounds: No  Symptom course: Stable  Weight trend: Stable  Complications assessed today?: No    Patient Problem List and Family Medical History reviewed for relevant medical history, current medical status, and diabetes risk factors.    Vitals:  There were no vitals taken for this visit.  Estimated body mass index is 26.53 kg/m  as calculated from the following:    Height as of 1/14/21: 1.63 m (5' 4.17\").    Weight as of 1/14/21: 70.5 kg (155 lb 6 oz).   Last 3 BP:   BP Readings from Last 3 Encounters:   01/14/21 136/78   01/15/20 (!) 143/78   01/13/20 123/78       History   Smoking Status     Never Smoker   Smokeless Tobacco     Never Used       Labs:  Lab Results   Component Value Date    A1C 8.3 01/14/2021     Lab Results   Component Value Date     01/14/2021     Lab Results   Component Value Date     01/14/2021     HDL Cholesterol   Date Value Ref Range Status   01/14/2021 53 >39 mg/dL Final   ]  GFR Estimate   Date Value Ref Range Status   01/14/2021 90 >60 mL/min/[1.73_m2] Final     Comment:     Non  GFR Calc  Starting 12/18/2018, serum creatinine based estimated GFR (eGFR) will be   calculated using the Chronic Kidney Disease Epidemiology Collaboration   (CKD-EPI) equation.       GFR " Estimate If Black   Date Value Ref Range Status   01/14/2021 >90 >60 mL/min/[1.73_m2] Final     Comment:      GFR Calc  Starting 12/18/2018, serum creatinine based estimated GFR (eGFR) will be   calculated using the Chronic Kidney Disease Epidemiology Collaboration   (CKD-EPI) equation.       Lab Results   Component Value Date    CR 0.94 01/14/2021     No results found for: MICROALBUMIN    Healthy Eating:  Healthy Eating Assessed Today: Yes  Cultural/Shinto diet restrictions?: No  Meals include: Breakfast, Lunch, Dinner  Breakfast: 2 eggs with 1 piece of toast with black coffee  Lunch: chicken with vegetables (salad)  Dinner: meat with salad  Beverages: Tea, Coffee, Alcohol, Water  Has patient met with a dietitian in the past?: No    Being Active:  Being Active Assessed Today: Yes  Exercise:: Yes  Days per week of moderate to strenuous exercise (like a brisk walk): 5    Monitoring:  Monitoring Assessed Today: Yes  Did patient bring glucose meter to appointment? : Yes(bg log)  Times checking blood sugar at home (number): 2  Blood glucose trend: Increasing    Date Breakfast  Lunch  Dinner  Bedtime    Before After Before After Before After    1/27 194         1/26 147     200    1/25 135     200    1/24 130     180    1/23 157     228    1/22 85     180    1/21 190     230    1/20 94     157    1/19 116     170    1/18 170     230    1/17 130     190    1/16 104     170    1/15 94     157        Taking Medications:  Diabetes Medication(s)     Insulin       insulin aspart (NOVOLOG PEN) 100 UNIT/ML pen    statrt 5 units with meals but adjust as needed based on sugars and instruction from diabetes educator     insulin glargine (LANTUS SOLOSTAR) 100 UNIT/ML pen    45 UNITS AT BEDTIME OR AS DIRECTED          Taking Medication Assessed Today: Yes  Current Treatments: Insulin Injections    Problem Solving:  Problem Solving Assessed Today: Yes    Reducing Risks:  Reducing Risks Assessed Today: Yes    Healthy  Coping:     Patient Activation Measure Survey Score:  KG Score (Last Two) 12/18/2012   KG Raw Score 38   Activation Score 52.9   KG Level 2       Diabetes knowledge and skills assessment:   Patient is knowledgeable in diabetes management concepts related to: Monitoring and Taking Medication    Patient needs further education on the following diabetes management concepts: Healthy Eating, Being Active, Monitoring, Taking Medication, Problem Solving, Reducing Risks and Healthy Coping    Based on learning assessment above, most appropriate setting for further diabetes education would be: Individual setting.      INTERVENTIONS:    Education provided today on:  AADE Self-Care Behaviors:  Healthy Eating: carbohydrate counting, consistency in amount, composition, and timing of food intake, portion control and label reading  Monitoring: purpose, log and interpret results and individual blood glucose targets  Taking Medication: action of prescribed medication, drawing up, administering and storing injectable diabetes medications, side effects of prescribed medications and when to take medications  Problem Solving: low blood glucose - causes, signs/symptoms, treatment and prevention    Opportunities for ongoing education and support in diabetes-self management were discussed.    Pt verbalized understanding of concepts discussed and recommendations provided today.       Education Materials Provided:  BG Log Sheet, Carbohydrate Counting and My Plate Planner      ASSESSMENT:  Alfred states that his BG have come down a little bit. He has been running after his dinner if his blood sugars are elevated to get them down faster. He has not started taking Novolog. Today we discussed adding Novolog with dinner only until we get some after breakfast and after lunch readings. Recommended that Alfred test BG 3 times/day at various times, sent BG log for patient to record BG.  We also discussed Elizabeth CGM, Alfred is interested in this, sent  order to provider to sign.     Mailed patient BG log, carb counting, plate planner    Patient's most recent   Lab Results   Component Value Date    A1C 8.3 01/14/2021    is not meeting goal of <7.0    PLAN  See Patient Instructions for co-developed, patient-stated behavior change goals.  AVS printed and provided to patient today. See Follow-Up section for recommended follow-up.    Start taking Novolog, 5 units before dinner  Test BG 3 times/day at a variety of times, before or 2 hours after meals  Start Elizabeth Freestyle if covered by insurance  Follow-up in 1 month    Sherry Judd RD, LD, CDE  Time Spent: 30 minutes  Encounter Type: Individual    Any diabetes medication dose changes were made via the CDE Protocol and Collaborative Practice Agreement with the patient's referring provider. A copy of this encounter was shared with the provider.

## 2021-01-27 NOTE — TELEPHONE ENCOUNTER
Dr. Little,    Recommended Elizabeth Freestyle 14 day system for better BG management. Pended order, please sign if you agree with plan.     Thanks,  Sherry Judd, RD, LD, CDE

## 2021-01-28 NOTE — PATIENT INSTRUCTIONS
Plan:  Start taking Novolog, 5 units before dinner  Test BG 3 times/day at a variety of times, before or 2 hours after meals  Start Elizabeth Freestyle if covered by insurance  Follow-up in 1 month    Sherry Judd RD, LD, CDE    Diabetes Support Resources:  Websites: American Association of Diabetes Educators Participant Resources: www.diabeteseducator.org/living-with-diabetes   American Diabetes Association: www.diabetes.org  Diabetes Together 2 Goal: http://qlzsbjol8ouop.org     Bring blood glucose meter and logbook with you to all doctor and follow-up appointments.    Diabetes Education Telephone Visit Follow-up:    We realize your time is valuable and your health is important! We offer a convenient Telephone Visit follow up! It s a quick way to check in for a medication dose adjustment without having to come back to clinic as soon.    Telephone Visits are often covered by insurance. Please check with your insurance plan to see if this type of visit is covered. If not, the cost is less expensive than an office visit:      Up to 10 minutes (Code 09704): $30    11-20 minutes (Code 98067): $59    More than 20 minutes (Code 75254): $85    Talk with your Diabetes Educator if you want to learn more.      [facility]:  For Your Diabetes Education and Nutrition Appointments Call:  [phone]   For Diabetes Education or Nutrition Related Questions:   Phone: [phone]  Send Igea Message   If you need a medication refill please contact your pharmacy. Please allow 3 business days for your refills to be completed.

## 2021-02-03 ENCOUNTER — OFFICE VISIT (OUTPATIENT)
Dept: OPTOMETRY | Facility: CLINIC | Age: 56
End: 2021-02-03
Payer: COMMERCIAL

## 2021-02-03 DIAGNOSIS — E11.3293 MILD NONPROLIFERATIVE DIABETIC RETINOPATHY OF BOTH EYES WITHOUT MACULAR EDEMA ASSOCIATED WITH TYPE 2 DIABETES MELLITUS (H): ICD-10-CM

## 2021-02-03 DIAGNOSIS — H52.4 PRESBYOPIA: ICD-10-CM

## 2021-02-03 DIAGNOSIS — Z01.01 ENCOUNTER FOR EXAMINATION OF EYES AND VISION WITH ABNORMAL FINDINGS: Primary | ICD-10-CM

## 2021-02-03 PROCEDURE — 92014 COMPRE OPH EXAM EST PT 1/>: CPT | Performed by: OPTOMETRIST

## 2021-02-03 ASSESSMENT — CONF VISUAL FIELD
OS_NORMAL: 1
OD_NORMAL: 1
METHOD: COUNTING FINGERS

## 2021-02-03 ASSESSMENT — VISUAL ACUITY
OD_SC: J7
OS_SC+: +2
METHOD: SNELLEN - LINEAR
OD_SC: 20/25-
OS_SC: 20/30
OS_SC: J16

## 2021-02-03 ASSESSMENT — CUP TO DISC RATIO
OD_RATIO: 0.35
OS_RATIO: 0.25

## 2021-02-03 ASSESSMENT — REFRACTION_MANIFEST
OS_SPHERE: -0.25
OS_AXIS: 073
OD_CYLINDER: SPHERE
OS_ADD: +1.75
OD_ADD: +1.75
OS_CYLINDER: +0.75
OD_SPHERE: PLANO

## 2021-02-03 ASSESSMENT — SLIT LAMP EXAM - LIDS
COMMENTS: NORMAL
COMMENTS: NORMAL

## 2021-02-03 ASSESSMENT — TONOMETRY
IOP_METHOD: APPLANATION
OD_IOP_MMHG: 18
OS_IOP_MMHG: 16

## 2021-02-03 ASSESSMENT — EXTERNAL EXAM - LEFT EYE: OS_EXAM: NORMAL

## 2021-02-03 ASSESSMENT — EXTERNAL EXAM - RIGHT EYE: OD_EXAM: NORMAL

## 2021-02-03 NOTE — PATIENT INSTRUCTIONS
Patient educated on importance of good blood sugar control.  Letter sent to primary care provider with diabetic eye exam report.     Continue use of over the counter reading glasses, as needed.     Return in 1 year for comprehensive eye exam, or sooner if needed.     The effects of the dilating drops last for 4- 6 hours.  You will be more sensitive to light and vision will be blurry up close.  Mydriatic sunglasses were given if needed.    Gianfranco Kaye, OD  [facility] [place]  [address] [place] [address]. [place]  [place]  [address] [phone]

## 2021-02-03 NOTE — LETTER
2/3/2021         RE: Alfred Whitakerrez  [address]  [facility] [address]        Dear Colleague,    Thank you for referring your patient, Alfred Koenig, to [facility]. Please see a copy of my visit note below.    Chief Complaint   Patient presents with     Annual Eye Exam     Accompanied by spouse  Hemoglobin A1C   Date Value Ref Range Status   01/14/2021 8.3 (H) 0 - 5.6 % Final     Comment:     Normal <5.7% Prediabetes 5.7-6.4%  Diabetes 6.5% or higher - adopted from ADA   consensus guidelines.     12/05/2019 8.4 (H) 0 - 5.6 % Final     Comment:     Normal <5.7% Prediabetes 5.7-6.4%  Diabetes 6.5% or higher - adopted from ADA   consensus guidelines.     11/30/2018 8.5 (H) 0 - 5.6 % Final     Comment:     Normal <5.7% Prediabetes 5.7-6.4%  Diabetes 6.5% or higher - adopted from ADA   consensus guidelines.          Last Eye Exam: 01/30/2019  Dilated Previously: Yes     What are you currently using to see?  does not use glasses or contacts     Distance Vision Acuity: Satisfied with vision     Near Vision Acuity: Satisfied with vision while reading  with readers     Eye Comfort: good  Do you use eye drops? : No  Occupation or Hobbies: reads on phone     Sandranigel Null, CO 2/3/2021 1:13 PM    Medical, surgical and family histories reviewed and updated 2/3/2021.        OBJECTIVE: See Ophthalmology exam     ASSESSMENT:    ICD-10-CM    1. Encounter for examination of eyes and vision with abnormal findings  Z01.01    2. Mild nonproliferative diabetic retinopathy of both eyes without macular edema associated with type 2 diabetes mellitus (H)  E11.3293    3. Presbyopia  H52.4       PLAN:     Alfred Koenig aware  eye exam results will be sent to Davon Little  Patient Instructions   Patient educated on importance of good blood sugar control.  Letter sent to primary care provider with diabetic eye exam report.     Continue use of over the counter reading glasses, as needed.      Return in 1 year for comprehensive eye exam, or sooner if needed.     The effects of the dilating drops last for 4- 6 hours.  You will be more sensitive to light and vision will be blurry up close.  Mydriatic sunglasses were given if needed.    Gianfranco Kaye, AGUILAR  [facility]  [address] [place] [address]. [place]  [place]  [address]    [phone]             Again, thank you for allowing me to participate in the care of your patient.        Sincerely,        Gianfranco Kaye, OD

## 2021-02-03 NOTE — PROGRESS NOTES
Chief Complaint   Patient presents with     Annual Eye Exam     Accompanied by spouse  Hemoglobin A1C   Date Value Ref Range Status   01/14/2021 8.3 (H) 0 - 5.6 % Final     Comment:     Normal <5.7% Prediabetes 5.7-6.4%  Diabetes 6.5% or higher - adopted from ADA   consensus guidelines.     12/05/2019 8.4 (H) 0 - 5.6 % Final     Comment:     Normal <5.7% Prediabetes 5.7-6.4%  Diabetes 6.5% or higher - adopted from ADA   consensus guidelines.     11/30/2018 8.5 (H) 0 - 5.6 % Final     Comment:     Normal <5.7% Prediabetes 5.7-6.4%  Diabetes 6.5% or higher - adopted from ADA   consensus guidelines.          Last Eye Exam: 01/30/2019  Dilated Previously: Yes     What are you currently using to see?  does not use glasses or contacts     Distance Vision Acuity: Satisfied with vision     Near Vision Acuity: Satisfied with vision while reading  with readers     Eye Comfort: good  Do you use eye drops? : No  Occupation or Hobbies: reads on phone     VISHAL Murdock 2/3/2021 1:13 PM    Medical, surgical and family histories reviewed and updated 2/3/2021.        OBJECTIVE: See Ophthalmology exam     ASSESSMENT:    ICD-10-CM    1. Encounter for examination of eyes and vision with abnormal findings  Z01.01    2. Mild nonproliferative diabetic retinopathy of both eyes without macular edema associated with type 2 diabetes mellitus (H)  E11.3293    3. Presbyopia  H52.4       PLAN:     Alfred Koenig aware  eye exam results will be sent to Davon Little  Patient Instructions   Patient educated on importance of good blood sugar control.  Letter sent to primary care provider with diabetic eye exam report.     Continue use of over the counter reading glasses, as needed.     Return in 1 year for comprehensive eye exam, or sooner if needed.     The effects of the dilating drops last for 4- 6 hours.  You will be more sensitive to light and vision will be blurry up close.  Mydriatic sunglasses were given if needed.    Gianfranco YEE  AGUILAR Kaye  [address]. [place]  [address]    [phone]

## 2021-02-20 ENCOUNTER — HEALTH MAINTENANCE LETTER (OUTPATIENT)
Age: 56
End: 2021-02-20

## 2021-02-24 ENCOUNTER — VIRTUAL VISIT (OUTPATIENT)
Dept: EDUCATION SERVICES | Facility: CLINIC | Age: 56
End: 2021-02-24
Payer: COMMERCIAL

## 2021-02-24 DIAGNOSIS — E11.319 TYPE 2 DIABETES MELLITUS WITH RETINOPATHY WITHOUT MACULAR EDEMA, WITH LONG-TERM CURRENT USE OF INSULIN, UNSPECIFIED LATERALITY, UNSPECIFIED RETINOPATHY SEVERITY (H): Primary | ICD-10-CM

## 2021-02-24 DIAGNOSIS — Z79.4 TYPE 2 DIABETES MELLITUS WITH RETINOPATHY WITHOUT MACULAR EDEMA, WITH LONG-TERM CURRENT USE OF INSULIN, UNSPECIFIED LATERALITY, UNSPECIFIED RETINOPATHY SEVERITY (H): Primary | ICD-10-CM

## 2021-02-24 PROCEDURE — G0108 DIAB MANAGE TRN  PER INDIV: HCPCS | Mod: 95

## 2021-02-25 NOTE — PATIENT INSTRUCTIONS
Plan:   Take Novolog dose 5-10 minutes before breakfast  Continue with 20 units of Lantus      Diabetes Support Resources:  Websites: American Association of Diabetes Educators Participant Resources: www.diabeteseducator.org/living-with-diabetes   Diabetes Together 2 Goal: http://akdfpuvg0jajm.org     Bring blood glucose meter and logbook with you to all doctor and follow-up appointments.    Diabetes Education Telephone Visit Follow-up:    We realize your time is valuable and your health is important! We offer a convenient Telephone Visit follow up! It s a quick way to check in for a medication dose adjustment without having to come back to clinic as soon.    Telephone Visits are often covered by insurance. Please check with your insurance plan to see if this type of visit is covered. If not, the cost is less expensive than an office visit:      Up to 10 minutes (Code 33594): $30    11-20 minutes (Code 04831): $59    More than 20 minutes (Code 35720): $85    Talk with your Diabetes Educator if you want to learn more.      [facility]:  For Your Diabetes Education and Nutrition Appointments Call:  [phone]   For Diabetes Education or Nutrition Related Questions:   Phone: [phone]  Send MindJolt Message   If you need a medication refill please contact your pharmacy. Please allow 3 business days for your refills to be completed.

## 2021-02-25 NOTE — PROGRESS NOTES
Diabetes Follow-up    Subjective/Objective:    Telephone visit for BG review. Last date of communication was: .    Diabetes is being managed with   Diabetes Medications   Diabetes Medication(s)     Insulin       insulin aspart (NOVOLOG PEN) 100 UNIT/ML pen    statrt 5 units with meals but adjust as needed based on sugars and instruction from diabetes educator     insulin glargine (LANTUS SOLOSTAR) 100 UNIT/ML pen    45 UNITS AT BEDTIME OR AS DIRECTED      Novolo units - Alfred is only taking Novolog with breakfast, he has been taking it after he eats, when his BG starts rising.   Lantus: 45 units --> 20 units [place] decreased his Lantus dose to 20 units    DInner: white rice, with vegetables     : toast with 2 eggs                  Assessment:  BG fluctuating greatly.  Alfred has been taking 1 novolog dose/day and he has been taking it after he eats his breakfast when his BG starts rising.  Explained the BG rise and the action of Novolog and recommended that he take the Novolog 5-10 minutes before he eats his breakfast.  Will reassess next week.     Plan/Response:  See Patient Instructions for co-developed, patient-stated behavior change goals.  Alfred will start taking Novolog dose prior to breakfast meal  Continue with 20 units of Lantus  Follow-up next week    Sherry Judd RD, LD, CDE  Time spent: 30 minutes    Any diabetes medication dose changes were made via the CDE Protocol and Collaborative Practice Agreement with the patient's referring provider. A copy of this encounter was shared with the provider.

## 2021-03-01 ENCOUNTER — VIRTUAL VISIT (OUTPATIENT)
Dept: EDUCATION SERVICES | Facility: CLINIC | Age: 56
End: 2021-03-01
Payer: COMMERCIAL

## 2021-03-01 DIAGNOSIS — N52.9 ERECTILE DYSFUNCTION, UNSPECIFIED ERECTILE DYSFUNCTION TYPE: ICD-10-CM

## 2021-03-01 DIAGNOSIS — Z79.4 TYPE 2 DIABETES MELLITUS WITH RETINOPATHY WITHOUT MACULAR EDEMA, WITH LONG-TERM CURRENT USE OF INSULIN, UNSPECIFIED LATERALITY, UNSPECIFIED RETINOPATHY SEVERITY (H): ICD-10-CM

## 2021-03-01 DIAGNOSIS — E11.319 TYPE 2 DIABETES MELLITUS WITH RETINOPATHY WITHOUT MACULAR EDEMA, WITH LONG-TERM CURRENT USE OF INSULIN, UNSPECIFIED LATERALITY, UNSPECIFIED RETINOPATHY SEVERITY (H): ICD-10-CM

## 2021-03-01 PROCEDURE — 99207 PR NO CHARGE LOS: CPT | Mod: 95

## 2021-03-01 NOTE — PROGRESS NOTES
Diabetes Follow-up    Subjective/Objective:    Telephone visit for BG review    Diabetes is being managed with   Diabetes Medications   Diabetes Medication(s)     Insulin       insulin aspart (NOVOLOG PEN) 100 UNIT/ML pen    statrt 5 units with meals but adjust as needed based on sugars and instruction from diabetes educator     insulin glargine (LANTUS SOLOSTAR) 100 UNIT/ML pen    45 UNITS AT BEDTIME OR AS DIRECTED          BG/Food Log:         Assessment:    Alfred has been taking 5 units of Novolog before breakfast instead of after and he is having less hypoglycemia in the afternoons. However he is having some lows in the early morning. We discussed decreasing Lantus to 18 units and continuing with 5 units of Novolog with breakfast. Will consider adding Novolog to lunch or dinner at follow-up visit.     Plan/Response:  Decrease Lantus dose: 0-0-0-20 --> 0-0-0-18  Continue with Novolo-0-0-0  Consider adding lunch and dinner dose of Novolog at next follow-up    Sherry Judd, RD, LD, CDE  Time spent: 4 minutes    Any diabetes medication dose changes were made via the CDE Protocol and Collaborative Practice Agreement with the patient's referring provider. A copy of this encounter was shared with the provider.

## 2021-03-03 RX ORDER — SILDENAFIL 100 MG/1
TABLET, FILM COATED ORAL
Qty: 6 TABLET | Refills: 3 | Status: SHIPPED | OUTPATIENT
Start: 2021-03-03 | End: 2022-12-16

## 2021-03-24 DIAGNOSIS — Z79.4 TYPE 2 DIABETES MELLITUS WITH RETINOPATHY WITHOUT MACULAR EDEMA, WITH LONG-TERM CURRENT USE OF INSULIN, UNSPECIFIED LATERALITY, UNSPECIFIED RETINOPATHY SEVERITY (H): ICD-10-CM

## 2021-03-24 DIAGNOSIS — E11.319 TYPE 2 DIABETES MELLITUS WITH RETINOPATHY WITHOUT MACULAR EDEMA, WITH LONG-TERM CURRENT USE OF INSULIN, UNSPECIFIED LATERALITY, UNSPECIFIED RETINOPATHY SEVERITY (H): ICD-10-CM

## 2021-03-25 DIAGNOSIS — E11.319 TYPE 2 DIABETES MELLITUS WITH RETINOPATHY WITHOUT MACULAR EDEMA, WITH LONG-TERM CURRENT USE OF INSULIN, UNSPECIFIED LATERALITY, UNSPECIFIED RETINOPATHY SEVERITY (H): ICD-10-CM

## 2021-03-25 DIAGNOSIS — Z79.4 TYPE 2 DIABETES MELLITUS WITH RETINOPATHY WITHOUT MACULAR EDEMA, WITH LONG-TERM CURRENT USE OF INSULIN, UNSPECIFIED LATERALITY, UNSPECIFIED RETINOPATHY SEVERITY (H): ICD-10-CM

## 2021-03-25 NOTE — TELEPHONE ENCOUNTER
Patient insulin use increased at January appointment. They need his insulin needle prescription to reflect this change. Patient is running out of needles too soon. Request cued below for provider approval.     Thank you,   Lina Mayfield RN

## 2021-06-24 ENCOUNTER — OFFICE VISIT (OUTPATIENT)
Dept: OPTOMETRY | Facility: CLINIC | Age: 56
End: 2021-06-24
Payer: COMMERCIAL

## 2021-06-24 DIAGNOSIS — H00.024 HORDEOLUM INTERNUM OF LEFT UPPER EYELID: Primary | ICD-10-CM

## 2021-06-24 DIAGNOSIS — T15.11XA FOREIGN BODY OF RIGHT CONJUNCTIVAL SAC, INITIAL ENCOUNTER: ICD-10-CM

## 2021-06-24 PROCEDURE — 99213 OFFICE O/P EST LOW 20 MIN: CPT | Performed by: OPTOMETRIST

## 2021-06-24 RX ORDER — AMOXICILLIN AND CLAVULANATE POTASSIUM 500; 125 MG/1; MG/1
1 TABLET, FILM COATED ORAL 3 TIMES DAILY
Qty: 30 TABLET | Refills: 0 | Status: SHIPPED | OUTPATIENT
Start: 2021-06-24 | End: 2021-07-04

## 2021-06-24 ASSESSMENT — VISUAL ACUITY
OS_SC+: -1
OD_SC+: -1
OD_SC: 20/20
METHOD: SNELLEN - LINEAR
OS_SC: 20/25

## 2021-06-24 NOTE — LETTER
6/24/2021         RE: Alfred Koenig  [address]  [facility] [address]        Dear Colleague,    Thank you for referring your patient, Alfred Koenig, to the [facility]. Please see a copy of my visit note below.    Chief Complaint   Patient presents with     Eye Problem Left Eye     Here with wife  Chief Complaint(s) and History of Present Illness(es)     Eye Problem Left Eye     Laterality: left eye    Onset: gradual    Onset: 1 week ago    Frequency: constantly    Associated symptoms: redness, pain with eye movement and swelling.  Negative for photophobia    Treatments tried: artificial tears    Response to treatment: no improvement    Pain scale: 0/10              Comments     Patient said that about a week ago he started to have a bump on his eye. He has a swollen upper lid on his left eye. It is red and he said that it hurts a bit when he moves his eye or ouches it.   He put drops in and said that it really didn't seem to notice a difference.      he was cutting wood a couple weeks ago- this gave + foreign body senation  In  Right eye               Do you wear contact lenses? He does not wear glasses or contacts        Kasia Apple Optometric Assistant      Review of Symptoms    EYES: See HPI  CONSTITUTIONAL: patient reports feeling healthy, denies fever        Medical, surgical and family histories reviewed and updated 6/24/2021.         OBJECTIVE: See Ophthalmology exam    ASSESSMENT:    ICD-10-CM    1. Hordeolum internum of left upper eyelid  H00.024 amoxicillin-clavulanate (AUGMENTIN) 500-125 MG tablet   2. Foreign body of right conjunctival sac, initial encounter  T15.11XA       PLAN:    Patient Instructions   Use warm compress 5 minutes three times a day left eye   Take all of antibiotics     Use over the counter artificial tears 3 times a day as needed in right ( Soothe XP , Thera Tears, Thera Tears Extra, Systane Ultra or Systane Complete )    Return to clinic 1  week as needed     Nikole Ramos, OD  [phone]                       Again, thank you for allowing me to participate in the care of your patient.        Sincerely,        Nikole Ramos, OD

## 2021-06-24 NOTE — PROGRESS NOTES
Chief Complaint   Patient presents with     Eye Problem Left Eye     Here with wife  Chief Complaint(s) and History of Present Illness(es)     Eye Problem Left Eye     Laterality: left eye    Onset: gradual    Onset: 1 week ago    Frequency: constantly    Associated symptoms: redness, pain with eye movement and swelling.  Negative for photophobia    Treatments tried: artificial tears    Response to treatment: no improvement    Pain scale: 0/10              Comments     Patient said that about a week ago he started to have a bump on his eye. He has a swollen upper lid on his left eye. It is red and he said that it hurts a bit when he moves his eye or ouches it.   He put drops in and said that it really didn't seem to notice a difference.      he was cutting wood a couple weeks ago- this gave + foreign body senation  In  Right eye               Do you wear contact lenses? He does not wear glasses or contacts        Kasia Apple Optometric Assistant      Review of Symptoms    EYES: See HPI  CONSTITUTIONAL: patient reports feeling healthy, denies fever        Medical, surgical and family histories reviewed and updated 6/24/2021.         OBJECTIVE: See Ophthalmology exam    ASSESSMENT:    ICD-10-CM    1. Hordeolum internum of left upper eyelid  H00.024 amoxicillin-clavulanate (AUGMENTIN) 500-125 MG tablet   2. Foreign body of right conjunctival sac, initial encounter  T15.11XA       PLAN:    Patient Instructions   Use warm compress 5 minutes three times a day left eye   Take all of antibiotics     Use over the counter artificial tears 3 times a day as needed in right ( Soothe XP , Thera Tears, Thera Tears Extra, Systane Ultra or Systane Complete )    Return to clinic 1 week as needed     Nikole Ramos, OD  [phone]

## 2021-06-24 NOTE — PATIENT INSTRUCTIONS
Use warm compress 5 minutes three times a day left eye   Take all of antibiotics     Use over the counter artificial tears 3 times a day as needed in right ( Soothe XP , Thera Tears, Thera Tears Extra, Systane Ultra or Systane Complete )    Return to clinic 1 week as needed     Nikole Ramos, OD  [phone]

## 2021-08-01 ENCOUNTER — HEALTH MAINTENANCE LETTER (OUTPATIENT)
Age: 56
End: 2021-08-01

## 2021-09-26 ENCOUNTER — HEALTH MAINTENANCE LETTER (OUTPATIENT)
Age: 56
End: 2021-09-26

## 2022-01-11 ENCOUNTER — OFFICE VISIT (OUTPATIENT)
Dept: FAMILY MEDICINE | Facility: CLINIC | Age: 57
End: 2022-01-11
Payer: COMMERCIAL

## 2022-01-11 VITALS
TEMPERATURE: 97 F | BODY MASS INDEX: 25.86 KG/M2 | DIASTOLIC BLOOD PRESSURE: 68 MMHG | SYSTOLIC BLOOD PRESSURE: 136 MMHG | WEIGHT: 151.5 LBS | HEART RATE: 53 BPM

## 2022-01-11 DIAGNOSIS — Z79.4 TYPE 2 DIABETES MELLITUS WITH RETINOPATHY WITHOUT MACULAR EDEMA, WITH LONG-TERM CURRENT USE OF INSULIN, UNSPECIFIED LATERALITY, UNSPECIFIED RETINOPATHY SEVERITY (H): ICD-10-CM

## 2022-01-11 DIAGNOSIS — R53.83 FATIGUE, UNSPECIFIED TYPE: ICD-10-CM

## 2022-01-11 DIAGNOSIS — N52.9 ERECTILE DYSFUNCTION, UNSPECIFIED ERECTILE DYSFUNCTION TYPE: ICD-10-CM

## 2022-01-11 DIAGNOSIS — Z12.5 SCREENING FOR PROSTATE CANCER: ICD-10-CM

## 2022-01-11 DIAGNOSIS — E11.319 TYPE 2 DIABETES MELLITUS WITH RETINOPATHY WITHOUT MACULAR EDEMA, WITH LONG-TERM CURRENT USE OF INSULIN, UNSPECIFIED LATERALITY, UNSPECIFIED RETINOPATHY SEVERITY (H): ICD-10-CM

## 2022-01-11 DIAGNOSIS — E78.5 HYPERLIPIDEMIA LDL GOAL <100: Primary | ICD-10-CM

## 2022-01-11 DIAGNOSIS — E78.5 DYSLIPIDEMIA: ICD-10-CM

## 2022-01-11 LAB
ALBUMIN SERPL-MCNC: 3.8 G/DL (ref 3.4–5)
ALP SERPL-CCNC: 71 U/L (ref 40–150)
ALT SERPL W P-5'-P-CCNC: 30 U/L (ref 0–70)
ANION GAP SERPL CALCULATED.3IONS-SCNC: 5 MMOL/L (ref 3–14)
AST SERPL W P-5'-P-CCNC: 22 U/L (ref 0–45)
BASOPHILS # BLD AUTO: 0.1 10E3/UL (ref 0–0.2)
BASOPHILS NFR BLD AUTO: 1 %
BILIRUB SERPL-MCNC: 0.5 MG/DL (ref 0.2–1.3)
BUN SERPL-MCNC: 11 MG/DL (ref 7–30)
CALCIUM SERPL-MCNC: 8.7 MG/DL (ref 8.5–10.1)
CHLORIDE BLD-SCNC: 106 MMOL/L (ref 94–109)
CHOLEST SERPL-MCNC: 225 MG/DL
CO2 SERPL-SCNC: 29 MMOL/L (ref 20–32)
CREAT SERPL-MCNC: 0.87 MG/DL (ref 0.66–1.25)
CREAT UR-MCNC: 37 MG/DL
EOSINOPHIL # BLD AUTO: 0.3 10E3/UL (ref 0–0.7)
EOSINOPHIL NFR BLD AUTO: 6 %
ERYTHROCYTE [DISTWIDTH] IN BLOOD BY AUTOMATED COUNT: 12.7 % (ref 10–15)
FASTING STATUS PATIENT QL REPORTED: YES
GFR SERPL CREATININE-BSD FRML MDRD: >90 ML/MIN/1.73M2
GLUCOSE BLD-MCNC: 119 MG/DL (ref 70–99)
HBA1C MFR BLD: 7.8 % (ref 0–5.6)
HCT VFR BLD AUTO: 45.4 % (ref 40–53)
HDLC SERPL-MCNC: 54 MG/DL
HGB BLD-MCNC: 14.9 G/DL (ref 13.3–17.7)
LDLC SERPL CALC-MCNC: 156 MG/DL
LYMPHOCYTES # BLD AUTO: 2.2 10E3/UL (ref 0.8–5.3)
LYMPHOCYTES NFR BLD AUTO: 39 %
MCH RBC QN AUTO: 30.9 PG (ref 26.5–33)
MCHC RBC AUTO-ENTMCNC: 32.8 G/DL (ref 31.5–36.5)
MCV RBC AUTO: 94 FL (ref 78–100)
MICROALBUMIN UR-MCNC: <5 MG/L
MICROALBUMIN/CREAT UR: NORMAL MG/G{CREAT}
MONOCYTES # BLD AUTO: 0.5 10E3/UL (ref 0–1.3)
MONOCYTES NFR BLD AUTO: 10 %
NEUTROPHILS # BLD AUTO: 2.4 10E3/UL (ref 1.6–8.3)
NEUTROPHILS NFR BLD AUTO: 44 %
NONHDLC SERPL-MCNC: 171 MG/DL
PLATELET # BLD AUTO: 237 10E3/UL (ref 150–450)
POTASSIUM BLD-SCNC: 4.3 MMOL/L (ref 3.4–5.3)
PROT SERPL-MCNC: 7.1 G/DL (ref 6.8–8.8)
PSA SERPL-MCNC: 1.77 UG/L (ref 0–4)
RBC # BLD AUTO: 4.82 10E6/UL (ref 4.4–5.9)
SODIUM SERPL-SCNC: 140 MMOL/L (ref 133–144)
TRIGL SERPL-MCNC: 76 MG/DL
TSH SERPL DL<=0.005 MIU/L-ACNC: 2.25 MU/L (ref 0.4–4)
WBC # BLD AUTO: 5.5 10E3/UL (ref 4–11)

## 2022-01-11 PROCEDURE — 99207 PR FOOT EXAM NO CHARGE: CPT | Performed by: FAMILY MEDICINE

## 2022-01-11 PROCEDURE — 99214 OFFICE O/P EST MOD 30 MIN: CPT | Performed by: FAMILY MEDICINE

## 2022-01-11 PROCEDURE — 80061 LIPID PANEL: CPT | Performed by: FAMILY MEDICINE

## 2022-01-11 PROCEDURE — 36415 COLL VENOUS BLD VENIPUNCTURE: CPT | Performed by: FAMILY MEDICINE

## 2022-01-11 PROCEDURE — 80050 GENERAL HEALTH PANEL: CPT | Performed by: FAMILY MEDICINE

## 2022-01-11 PROCEDURE — 83036 HEMOGLOBIN GLYCOSYLATED A1C: CPT | Performed by: FAMILY MEDICINE

## 2022-01-11 PROCEDURE — 82043 UR ALBUMIN QUANTITATIVE: CPT | Performed by: FAMILY MEDICINE

## 2022-01-11 PROCEDURE — G0103 PSA SCREENING: HCPCS | Performed by: FAMILY MEDICINE

## 2022-01-11 RX ORDER — INSULIN GLARGINE 100 [IU]/ML
INJECTION, SOLUTION SUBCUTANEOUS
Qty: 45 ML | Refills: 1 | Status: SHIPPED | OUTPATIENT
Start: 2022-01-11 | End: 2022-12-16

## 2022-01-11 RX ORDER — ROSUVASTATIN CALCIUM 5 MG/1
5 TABLET, COATED ORAL AT BEDTIME
Qty: 90 TABLET | Refills: 3 | Status: SHIPPED | OUTPATIENT
Start: 2022-01-11 | End: 2022-12-16

## 2022-01-11 RX ORDER — SILDENAFIL 100 MG/1
50-100 TABLET, FILM COATED ORAL DAILY PRN
Qty: 6 TABLET | Refills: 4 | Status: SHIPPED | OUTPATIENT
Start: 2022-01-11 | End: 2023-06-26

## 2022-01-11 RX ORDER — FLASH GLUCOSE SENSOR
1 KIT MISCELLANEOUS
Qty: 4 EACH | Refills: 11 | Status: SHIPPED | OUTPATIENT
Start: 2022-01-11 | End: 2022-12-16

## 2022-01-11 NOTE — PROGRESS NOTES
Krunal Simon is a 56 year old who presents for the following health issues     HPI     Diabetes Follow-up    How often are you checking your blood sugar? Three times daily  Blood sugar testing frequency justification:  Uncontrolled diabetes  What time of day are you checking your blood sugars (select all that apply)?  Before and after meals  Have you had any blood sugars above 200?  Yes   Have you had any blood sugars below 70?  No    What symptoms do you notice when your blood sugar is low?  None    What concerns do you have today about your diabetes? None     Do you have any of these symptoms? (Select all that apply)  No numbness or tingling in feet.  No redness, sores or blisters on feet.  No complaints of excessive thirst.  No reports of blurry vision.  No significant changes to weight.    Have you had a diabetic eye exam in the last 12 months? No        BP Readings from Last 2 Encounters:   01/11/22 (!) 151/79   01/14/21 136/78     Hemoglobin A1C POCT (%)   Date Value   01/14/2021 8.3 (H)   12/05/2019 8.4 (H)     LDL Cholesterol Calculated (mg/dL)   Date Value   01/14/2021 181 (H)   12/05/2019 132 (H)            How many servings of fruits and vegetables do you eat daily?  2-3    On average, how many sweetened beverages do you drink each day (Examples: soda, juice, sweet tea, etc.  Do NOT count diet or artificially sweetened beverages)?   0    How many days per week do you exercise enough to make your heart beat faster?     How many minutes a day do you exercise enough to make your heart beat faster?     How many days per week do you miss taking your medication? 0         Review of Systems   avg  glc  Recently about 130 or 150    Walking daily for 3 miles, a bit of running  , mostly walk    18 lantus daily    5 units of short acting with meals    Up to date on colonoscopy    No std concern        Objective    BP (!) 151/79 (BP Location: Right arm, Patient Position: Chair, Cuff Size: Adult Regular)    Pulse 53   Temp 97  F (36.1  C) (Temporal)   Wt 68.7 kg (151 lb 8 oz)   BMI 25.86 kg/m    Body mass index is 25.86 kg/m .  Physical Exam  Constitutional:       Appearance: He is well-developed.   HENT:      Head: Normocephalic and atraumatic.   Eyes:      Conjunctiva/sclera: Conjunctivae normal.   Neck:      Vascular: No carotid bruit.   Cardiovascular:      Rate and Rhythm: Normal rate and regular rhythm.      Heart sounds: Normal heart sounds.   Pulmonary:      Effort: Pulmonary effort is normal. No respiratory distress.      Breath sounds: Normal breath sounds.   Abdominal:      Palpations: Abdomen is soft.      Tenderness: There is no abdominal tenderness.   Neurological:      Mental Status: He is alert and oriented to person, place, and time.      Cranial Nerves: No cranial nerve deficit.   Psychiatric:         Speech: Speech normal.         Behavior: Behavior normal.     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 Refill meds/ supplies.  He has USEUM system.   Plan: HEMOGLOBIN A1C, Albumin Random Urine         Quantitative with Creat Ratio, OPTOMETRY         REFERRAL, insulin aspart (NOVOLOG PEN) 100         UNIT/ML pen, insulin glargine (LANTUS SOLOSTAR)        100 UNIT/ML pen, Continuous Blood Gluc Sensor         (FREESTYLE GLO 14 DAY SENSOR) MISC, insulin         pen needle (31G X 8 MM) 31G X 8 MM         miscellaneous, FOOT EXAM             (Z12.5) Screening for prostate cancer  Comment: psa   Plan: Prostate Specific Antigen Screen             (R53.83) Fatigue, unspecified type  Comment: check labs    Plan: CBC with Platelets & Differential, TSH with         free T4 reflex             (E78.5) Dyslipidemia  Comment: refill med   Plan: rosuvastatin (CRESTOR) 5 MG tablet             (N52.9) Erectile dysfunction, unspecified erectile dysfunction type  Comment: patient wanted to have this available.  Worked in past, no side effects.   Plan: sildenafil (VIAGRA) 100 MG tablet               I reviewed the patient's medications, allergies, medical history, family history, and social history.    Davon Little MD

## 2022-01-11 NOTE — PATIENT INSTRUCTIONS
Keep working on healthy diet/exercise     We will send you lab results    Call with problems/  questions

## 2022-01-13 NOTE — RESULT ENCOUNTER NOTE
Diabetes test ( hemoglobin a1c ) is not ideal but acceptable.  Stay on same diabetes medications and keep working on healthy diet/exercise.     Cholesterol still moderately elevated.  Stay on rosuvastatin.    Other labs are fine.    Davon Little MD

## 2022-03-13 ENCOUNTER — HEALTH MAINTENANCE LETTER (OUTPATIENT)
Age: 57
End: 2022-03-13

## 2022-08-28 ENCOUNTER — HEALTH MAINTENANCE LETTER (OUTPATIENT)
Age: 57
End: 2022-08-28

## 2022-12-16 ENCOUNTER — OFFICE VISIT (OUTPATIENT)
Dept: FAMILY MEDICINE | Facility: CLINIC | Age: 57
End: 2022-12-16
Payer: COMMERCIAL

## 2022-12-16 VITALS
SYSTOLIC BLOOD PRESSURE: 135 MMHG | WEIGHT: 141 LBS | HEART RATE: 56 BPM | HEIGHT: 64 IN | TEMPERATURE: 97 F | BODY MASS INDEX: 24.07 KG/M2 | OXYGEN SATURATION: 99 % | DIASTOLIC BLOOD PRESSURE: 70 MMHG

## 2022-12-16 DIAGNOSIS — E78.5 DYSLIPIDEMIA: ICD-10-CM

## 2022-12-16 DIAGNOSIS — E78.5 HYPERLIPIDEMIA LDL GOAL <100: ICD-10-CM

## 2022-12-16 DIAGNOSIS — Z79.4 TYPE 2 DIABETES MELLITUS WITH RETINOPATHY WITHOUT MACULAR EDEMA, WITH LONG-TERM CURRENT USE OF INSULIN, UNSPECIFIED LATERALITY, UNSPECIFIED RETINOPATHY SEVERITY (H): Primary | ICD-10-CM

## 2022-12-16 DIAGNOSIS — E11.319 TYPE 2 DIABETES MELLITUS WITH RETINOPATHY WITHOUT MACULAR EDEMA, WITH LONG-TERM CURRENT USE OF INSULIN, UNSPECIFIED LATERALITY, UNSPECIFIED RETINOPATHY SEVERITY (H): Primary | ICD-10-CM

## 2022-12-16 DIAGNOSIS — M15.9 OSTEOARTHRITIS OF MULTIPLE JOINTS, UNSPECIFIED OSTEOARTHRITIS TYPE: ICD-10-CM

## 2022-12-16 DIAGNOSIS — N52.9 ERECTILE DYSFUNCTION, UNSPECIFIED ERECTILE DYSFUNCTION TYPE: ICD-10-CM

## 2022-12-16 LAB
ALBUMIN SERPL-MCNC: 3.9 G/DL (ref 3.4–5)
ALP SERPL-CCNC: 65 U/L (ref 40–150)
ALT SERPL W P-5'-P-CCNC: 32 U/L (ref 0–70)
ANION GAP SERPL CALCULATED.3IONS-SCNC: 5 MMOL/L (ref 3–14)
AST SERPL W P-5'-P-CCNC: 27 U/L (ref 0–45)
BILIRUB SERPL-MCNC: 0.7 MG/DL (ref 0.2–1.3)
BUN SERPL-MCNC: 9 MG/DL (ref 7–30)
CALCIUM SERPL-MCNC: 9.1 MG/DL (ref 8.5–10.1)
CHLORIDE BLD-SCNC: 100 MMOL/L (ref 94–109)
CHOLEST SERPL-MCNC: 258 MG/DL
CO2 SERPL-SCNC: 29 MMOL/L (ref 20–32)
CREAT SERPL-MCNC: 0.83 MG/DL (ref 0.66–1.25)
CREAT UR-MCNC: 33 MG/DL
FASTING STATUS PATIENT QL REPORTED: YES
GFR SERPL CREATININE-BSD FRML MDRD: >90 ML/MIN/1.73M2
GLUCOSE BLD-MCNC: 130 MG/DL (ref 70–99)
HBA1C MFR BLD: 7.5 % (ref 0–5.6)
HDLC SERPL-MCNC: 77 MG/DL
LDLC SERPL CALC-MCNC: 171 MG/DL
MICROALBUMIN UR-MCNC: <5 MG/L
MICROALBUMIN/CREAT UR: NORMAL MG/G{CREAT}
NONHDLC SERPL-MCNC: 181 MG/DL
POTASSIUM BLD-SCNC: 3.8 MMOL/L (ref 3.4–5.3)
PROT SERPL-MCNC: 7.1 G/DL (ref 6.8–8.8)
SODIUM SERPL-SCNC: 134 MMOL/L (ref 133–144)
TRIGL SERPL-MCNC: 51 MG/DL

## 2022-12-16 PROCEDURE — 36415 COLL VENOUS BLD VENIPUNCTURE: CPT | Performed by: FAMILY MEDICINE

## 2022-12-16 PROCEDURE — 99214 OFFICE O/P EST MOD 30 MIN: CPT | Performed by: FAMILY MEDICINE

## 2022-12-16 PROCEDURE — 99207 PR FOOT EXAM NO CHARGE: CPT | Performed by: FAMILY MEDICINE

## 2022-12-16 PROCEDURE — 83036 HEMOGLOBIN GLYCOSYLATED A1C: CPT | Performed by: FAMILY MEDICINE

## 2022-12-16 PROCEDURE — 80053 COMPREHEN METABOLIC PANEL: CPT | Performed by: FAMILY MEDICINE

## 2022-12-16 PROCEDURE — 82043 UR ALBUMIN QUANTITATIVE: CPT | Performed by: FAMILY MEDICINE

## 2022-12-16 PROCEDURE — 80061 LIPID PANEL: CPT | Performed by: FAMILY MEDICINE

## 2022-12-16 RX ORDER — INSULIN GLARGINE 100 [IU]/ML
INJECTION, SOLUTION SUBCUTANEOUS
Qty: 45 ML | Refills: 1 | Status: SHIPPED | OUTPATIENT
Start: 2022-12-16 | End: 2023-06-26

## 2022-12-16 RX ORDER — FLASH GLUCOSE SENSOR
1 KIT MISCELLANEOUS
Qty: 4 EACH | Refills: 11 | Status: SHIPPED | OUTPATIENT
Start: 2022-12-16 | End: 2024-02-16

## 2022-12-16 RX ORDER — ROSUVASTATIN CALCIUM 5 MG/1
5 TABLET, COATED ORAL AT BEDTIME
Qty: 90 TABLET | Refills: 3 | Status: SHIPPED | OUTPATIENT
Start: 2022-12-16 | End: 2024-02-16

## 2022-12-16 ASSESSMENT — PAIN SCALES - GENERAL: PAINLEVEL: NO PAIN (0)

## 2022-12-16 NOTE — PROGRESS NOTES
"Krunal Simon is a 57 year old  presenting for the following health issues:  Diabetes (Fasting)      History of Present Illness       Diabetes:   He presents for follow up of diabetes.  He is checking home blood glucose four or more times daily. He checks blood glucose before and after meals.  Blood glucose is sometimes over 200 and never under 70. He is aware of hypoglycemia symptoms including weakness. He is concerned about none and other.  He is not experiencing numbness or burning in feet, excessive thirst, blurry vision, weight changes or redness, sores or blisters on feet. The patient has not had a diabetic eye exam in the last 12 months.         He eats 4 or more servings of fruits and vegetables daily.He consumes 0 sweetened beverage(s) daily.He exercises with enough effort to increase his heart rate 30 to 60 minutes per day.  He exercises with enough effort to increase his heart rate 5 days per week.   He is taking medications regularly.        Review of Systems    occasionally gets a few minutes of pain left flank/ abd     About once a month    Bowels and bladder fine    No  Chest  Pain/ breathing problems    Bump on right shoulder to look at, no pain    No  Change in meds    About 5 units short acting just when needed    viagra working well    Sometimes sugars 200ish    Patient wanting to get back on metformin    Exercise brings sugars down          Objective    BP (!) 146/72 (BP Location: Left arm, Patient Position: Chair, Cuff Size: Adult Regular)   Pulse 56   Temp 97  F (36.1  C) (Temporal)   Ht 1.63 m (5' 4.17\")   Wt 64 kg (141 lb)   SpO2 99%   BMI 24.07 kg/m    Body mass index is 24.07 kg/m .  Physical Exam  Constitutional:       Appearance: He is well-developed and well-nourished.   HENT:      Head: Normocephalic and atraumatic.   Eyes:      Conjunctiva/sclera: Conjunctivae normal.   Neck:      Vascular: No carotid bruit.   Cardiovascular:      Rate and Rhythm: Normal rate and " regular rhythm.      Pulses: Intact distal pulses.      Heart sounds: Normal heart sounds.   Pulmonary:      Effort: Pulmonary effort is normal. No respiratory distress.      Breath sounds: Normal breath sounds.   Musculoskeletal:         General: No edema.   Neurological:      Mental Status: He is alert and oriented to person, place, and time.      Cranial Nerves: No cranial nerve deficit.   Psychiatric:         Mood and Affect: Mood and affect normal.         Speech: Speech normal.         Behavior: Behavior normal.      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   Plan to start 500 mg bid.   Plan: HEMOGLOBIN A1C, Lipid panel reflex to direct         LDL Non-fasting, Albumin Random Urine         Quantitative with Creat Ratio, metFORMIN         (GLUCOPHAGE) 500 MG tablet, Continuous Blood         Gluc Sensor (FREESTYLE GLO 14 DAY SENSOR)         MISC, insulin aspart (NOVOLOG PEN) 100 UNIT/ML         pen, insulin glargine (LANTUS SOLOSTAR) 100         UNIT/ML pen, FOOT EXAM             (E78.5) Hyperlipidemia LDL goal <100  Comment: on statin.  Check labs fasting   Plan: Lipid panel reflex to direct LDL Non-fasting,         Comprehensive metabolic panel             (E78.5) Dyslipidemia  Comment: as above   Plan: rosuvastatin (CRESTOR) 5 MG tablet             (N52.9) Erectile dysfunction, unspecified erectile dysfunction type  Comment: med  Working well prn   Plan: no change     (M15.9) Osteoarthritis of  multiple joints, unspecified osteoarthritis type  Comment: patient has oa of hands and likely this is causing the bony prominence  On shoulder but not bothersome  To patient   Plan: okay to monitor       I reviewed the patient's medications, allergies, medical history, family history, and social history.    Davon Little MD

## 2022-12-16 NOTE — PATIENT INSTRUCTIONS
We will send you lab results    If hemoglobin a1c high, plan to restart the  metformin 500 mg 2x daily    Other meds as is    Keep working on healthy diet/exercise

## 2022-12-18 NOTE — RESULT ENCOUNTER NOTE
Cholesterol is high.  Stay on rosuvastatin.  Keep working on healthy diet/exercise.    Diabetes test ( hemoglobin a1c ) is okay.    Other labs are okay also.    Davon Little MD

## 2022-12-21 ENCOUNTER — OFFICE VISIT (OUTPATIENT)
Dept: OPTOMETRY | Facility: CLINIC | Age: 57
End: 2022-12-21
Payer: COMMERCIAL

## 2022-12-21 DIAGNOSIS — Z01.01 ENCOUNTER FOR EXAMINATION OF EYES AND VISION WITH ABNORMAL FINDINGS: Primary | ICD-10-CM

## 2022-12-21 DIAGNOSIS — H52.4 PRESBYOPIA: ICD-10-CM

## 2022-12-21 DIAGNOSIS — E11.3293 MILD NONPROLIFERATIVE DIABETIC RETINOPATHY OF BOTH EYES WITHOUT MACULAR EDEMA ASSOCIATED WITH TYPE 2 DIABETES MELLITUS (H): ICD-10-CM

## 2022-12-21 PROCEDURE — 92015 DETERMINE REFRACTIVE STATE: CPT | Performed by: OPTOMETRIST

## 2022-12-21 PROCEDURE — 92014 COMPRE OPH EXAM EST PT 1/>: CPT | Performed by: OPTOMETRIST

## 2022-12-21 ASSESSMENT — REFRACTION_MANIFEST
OD_AXIS: 082
OS_ADD: +2.50
OD_CYLINDER: SPHERE
OS_AXIS: 072
OS_SPHERE: +0.25
OS_AXIS: 045
OS_CYLINDER: +0.50
OD_CYLINDER: +0.75
OD_SPHERE: PLANO
METHOD_AUTOREFRACTION: 1
OS_CYLINDER: +0.50
OD_SPHERE: PLANO
OS_SPHERE: +0.50
OD_ADD: +2.50

## 2022-12-21 ASSESSMENT — CUP TO DISC RATIO
OD_RATIO: 0.35
OS_RATIO: 0.25

## 2022-12-21 ASSESSMENT — CONF VISUAL FIELD
OD_INFERIOR_TEMPORAL_RESTRICTION: 0
OS_SUPERIOR_TEMPORAL_RESTRICTION: 0
OS_INFERIOR_TEMPORAL_RESTRICTION: 0
OS_INFERIOR_NASAL_RESTRICTION: 0
OS_NORMAL: 1
OD_SUPERIOR_NASAL_RESTRICTION: 0
OD_NORMAL: 1
OD_INFERIOR_NASAL_RESTRICTION: 0
OD_SUPERIOR_TEMPORAL_RESTRICTION: 0
OS_SUPERIOR_NASAL_RESTRICTION: 0
METHOD: COUNTING FINGERS

## 2022-12-21 ASSESSMENT — SLIT LAMP EXAM - LIDS
COMMENTS: NORMAL
COMMENTS: NORMAL

## 2022-12-21 ASSESSMENT — VISUAL ACUITY
METHOD: SNELLEN - LINEAR
OD_SC: 20/25
OD_SC+: -1
OS_SC: 20/25
OD_SC: 20/60
OS_SC: 20/200

## 2022-12-21 ASSESSMENT — KERATOMETRY
OD_K2POWER_DIOPTERS: 45.75
OS_K1POWER_DIOPTERS: 43.75
OS_AXISANGLE_DEGREES: 084
OD_AXISANGLE2_DEGREES: 170
OS_AXISANGLE2_DEGREES: 174
OD_K1POWER_DIOPTERS: 44.75
OD_AXISANGLE_DEGREES: 080
OS_K2POWER_DIOPTERS: 44.75

## 2022-12-21 ASSESSMENT — EXTERNAL EXAM - LEFT EYE: OS_EXAM: NORMAL

## 2022-12-21 ASSESSMENT — EXTERNAL EXAM - RIGHT EYE: OD_EXAM: NORMAL

## 2022-12-21 ASSESSMENT — TONOMETRY
OS_IOP_MMHG: 14
IOP_METHOD: APPLANATION
OD_IOP_MMHG: 16

## 2022-12-21 NOTE — PROGRESS NOTES
Chief Complaint   Patient presents with     Diabetic Eye Exam        Chief Complaint(s) and History of Present Illness(es)     Diabetic Eye Exam            Vision: is stable    Diabetes Type: Type 2 and on insulin    Duration: years    Blood Sugars: is uncontrolled (Continuous glucose monitor)               Lab Results   Component Value Date    A1C 7.5 12/16/2022    A1C 7.8 01/11/2022    A1C 8.3 01/14/2021    A1C 8.4 12/05/2019    A1C 8.5 11/30/2018    A1C 7.7 12/14/2017    A1C 8.0 04/10/2017          Last Eye Exam: 2/3/2021  Dilated Previously: Yes, side effects of dilation explained today    What are you currently using to see? +1.25 readers - did not bring with today     Distance Vision Acuity: Satisfied with vision    Near Vision Acuity: Satisfied with vision while reading and using computer with readers and unaided    Eye Comfort: good  Do you use eye drops? : No  Occupation or Hobbies: Landscaping/construction    [facility]     Medical, surgical and family histories reviewed and updated 12/21/2022.       OBJECTIVE: See Ophthalmology exam    ASSESSMENT:    ICD-10-CM    1. Encounter for examination of eyes and vision with abnormal findings  Z01.01       2. 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 Presbyopia  H52.4           PLAN:    Alfred Koenig aware  eye exam results will be sent to Davon Little  Patient Instructions   Patient educated on importance of good blood sugar control.  Letter sent to primary care provider with diabetic eye exam report.     No glasses prescription necessary. Continue with OTC reading glasses as needed.     Return in 1 year for a comprehensive eye exam, or sooner if needed.      The effects of the dilating drops last for 4- 6 hours.  You will be more sensitive to light and vision will be blurry up close.  Mydriatic sunglasses were given if needed.     Gianfranco Kaye, AGUILAR BECERRIL  [facility]  [address] [facility] [address] [place] [place] [place]  [place]  [address]    [phone]

## 2022-12-21 NOTE — PATIENT INSTRUCTIONS
Patient educated on importance of good blood sugar control.  Letter sent to primary care provider with diabetic eye exam report.     No glasses prescription necessary. Continue with OTC reading glasses as needed.     Return in 1 year for a comprehensive eye exam, or sooner if needed.      The effects of the dilating drops last for 4- 6 hours.  You will be more sensitive to light and vision will be blurry up close.  Mydriatic sunglasses were given if needed.     Gianfranco Kaye, OD  [facility]  [address] [place] [address]. [place]  [place]  [address]    [phone]

## 2022-12-21 NOTE — LETTER
12/21/2022         RE: Alfred HernandezKoenig  [address]  [facility] [address]        Dear Colleague,    Thank you for referring your patient, Alfred Koenig, to the [facility]. Please see a copy of my visit note below.    Chief Complaint   Patient presents with     Diabetic Eye Exam        Chief Complaint(s) and History of Present Illness(es)     Diabetic Eye Exam            Vision: is stable    Diabetes Type: Type 2 and on insulin    Duration: years    Blood Sugars: is uncontrolled (Continuous glucose monitor)               Lab Results   Component Value Date    A1C 7.5 12/16/2022    A1C 7.8 01/11/2022    A1C 8.3 01/14/2021    A1C 8.4 12/05/2019    A1C 8.5 11/30/2018    A1C 7.7 12/14/2017    A1C 8.0 04/10/2017          Last Eye Exam: 2/3/2021  Dilated Previously: Yes, side effects of dilation explained today    What are you currently using to see? +1.25 readers - did not bring with today     Distance Vision Acuity: Satisfied with vision    Near Vision Acuity: Satisfied with vision while reading and using computer with readers and unaided    Eye Comfort: good  Do you use eye drops? : No  Occupation or Hobbies: Landscaping/construction    [facility] [place]     Medical, surgical and family histories reviewed and updated 12/21/2022.       OBJECTIVE: See Ophthalmology exam    ASSESSMENT:    ICD-10-CM    1. Encounter for examination of eyes and vision with abnormal findings  Z01.01       2. 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 Presbyopia  H52.4           PLAN:    Alfred Koenig aware  eye exam results will be sent to Davon Little  Patient Instructions   Patient educated on importance of good blood sugar control.  Letter sent to primary care provider with diabetic eye exam report.     No glasses prescription necessary. Continue with OTC reading glasses as needed.     Return in 1 year  for a comprehensive eye exam, or sooner if needed.      The effects of the dilating drops last for 4- 6 hours.  You will be more sensitive to light and vision will be blurry up close.  Mydriatic sunglasses were given if needed.     Gianfranco Kaye, AGUILAR  [facility]  [address] [place] [address]. [facility] [place]  [address]    [phone]              Again, thank you for allowing me to participate in the care of your patient.        Sincerely,        Gianfranco Kaye OD

## 2023-01-01 NOTE — LETTER
[facility]  [address] [place]  [address]  [phone]        April 12, 2017    Alfred Koenig  [address]  [facility] [address]        Dear Alfred,    The diabetes test ( hemoglobin a1c ) is still higher than desireable ( ideally we want it about 7 ).  Increase insulin as needed, and keep working on healthy diet/exercise.     Then see us in about 3 months in clinic.     Other labs are fine.     Results for orders placed or performed in visit on 04/10/17   Albumin Random Urine Quantitative   Result Value Ref Range    Creatinine Urine 58 mg/dL    Albumin Urine mg/L <5 mg/L    Albumin Urine mg/g Cr Unable to calculate due to low value 0 - 17 mg/g Cr   Hemoglobin A1c   Result Value Ref Range    Hemoglobin A1C 8.0 (H) 4.3 - 6.0 %   Lipid panel reflex to direct LDL   Result Value Ref Range    Cholesterol 139 <200 mg/dL    Triglycerides 96 <150 mg/dL    HDL Cholesterol 57 >39 mg/dL    LDL Cholesterol Calculated 63 <100 mg/dL    Non HDL Cholesterol 82 <130 mg/dL       If you have any questions please call the clinic at [phone].    Sincerely,    Davon Little MD  SK   Problem: NICU 36+ weeks: Day of Life 3  Goal: Diagnostic Test/Procedures  Outcome: Progressing Towards Goal  Note: Weaning IVF and monitoring AC blood sugars  Goal: Nutrition/Diet  Outcome: Progressing Towards Goal  Note: Po fed ad nery overnight, meeting minimum PO volume

## 2023-01-08 ENCOUNTER — HEALTH MAINTENANCE LETTER (OUTPATIENT)
Age: 58
End: 2023-01-08

## 2023-01-25 DIAGNOSIS — Z79.4 TYPE 2 DIABETES MELLITUS WITH RETINOPATHY WITHOUT MACULAR EDEMA, WITH LONG-TERM CURRENT USE OF INSULIN, UNSPECIFIED LATERALITY, UNSPECIFIED RETINOPATHY SEVERITY (H): ICD-10-CM

## 2023-01-25 DIAGNOSIS — E11.319 TYPE 2 DIABETES MELLITUS WITH RETINOPATHY WITHOUT MACULAR EDEMA, WITH LONG-TERM CURRENT USE OF INSULIN, UNSPECIFIED LATERALITY, UNSPECIFIED RETINOPATHY SEVERITY (H): ICD-10-CM

## 2023-01-26 RX ORDER — PEN NEEDLE, DIABETIC 31 GX5/16"
NEEDLE, DISPOSABLE MISCELLANEOUS
Qty: 200 EACH | Refills: 5 | Status: SHIPPED | OUTPATIENT
Start: 2023-01-26 | End: 2024-07-06

## 2023-04-23 ENCOUNTER — HEALTH MAINTENANCE LETTER (OUTPATIENT)
Age: 58
End: 2023-04-23

## 2023-06-26 ENCOUNTER — OFFICE VISIT (OUTPATIENT)
Dept: FAMILY MEDICINE | Facility: CLINIC | Age: 58
End: 2023-06-26
Payer: COMMERCIAL

## 2023-06-26 VITALS
HEART RATE: 55 BPM | SYSTOLIC BLOOD PRESSURE: 138 MMHG | BODY MASS INDEX: 24.63 KG/M2 | OXYGEN SATURATION: 99 % | HEIGHT: 64 IN | WEIGHT: 144.25 LBS | RESPIRATION RATE: 16 BRPM | DIASTOLIC BLOOD PRESSURE: 65 MMHG | TEMPERATURE: 97.4 F

## 2023-06-26 DIAGNOSIS — Z12.5 SCREENING FOR PROSTATE CANCER: ICD-10-CM

## 2023-06-26 DIAGNOSIS — R53.83 FATIGUE, UNSPECIFIED TYPE: ICD-10-CM

## 2023-06-26 DIAGNOSIS — E11.319 TYPE 2 DIABETES MELLITUS WITH RETINOPATHY WITHOUT MACULAR EDEMA, WITH LONG-TERM CURRENT USE OF INSULIN, UNSPECIFIED LATERALITY, UNSPECIFIED RETINOPATHY SEVERITY (H): Primary | ICD-10-CM

## 2023-06-26 DIAGNOSIS — Z79.4 TYPE 2 DIABETES MELLITUS WITH RETINOPATHY WITHOUT MACULAR EDEMA, WITH LONG-TERM CURRENT USE OF INSULIN, UNSPECIFIED LATERALITY, UNSPECIFIED RETINOPATHY SEVERITY (H): Primary | ICD-10-CM

## 2023-06-26 DIAGNOSIS — E78.5 HYPERLIPIDEMIA LDL GOAL <100: ICD-10-CM

## 2023-06-26 DIAGNOSIS — N52.9 ERECTILE DYSFUNCTION, UNSPECIFIED ERECTILE DYSFUNCTION TYPE: ICD-10-CM

## 2023-06-26 DIAGNOSIS — S49.92XA INJURY OF LEFT UPPER ARM, INITIAL ENCOUNTER: ICD-10-CM

## 2023-06-26 DIAGNOSIS — I10 HYPERTENSION GOAL BP (BLOOD PRESSURE) < 140/90: ICD-10-CM

## 2023-06-26 LAB
ALBUMIN SERPL BCG-MCNC: 4.3 G/DL (ref 3.5–5.2)
ALP SERPL-CCNC: 74 U/L (ref 40–129)
ALT SERPL W P-5'-P-CCNC: 21 U/L (ref 0–70)
ANION GAP SERPL CALCULATED.3IONS-SCNC: 8 MMOL/L (ref 7–15)
AST SERPL W P-5'-P-CCNC: 34 U/L (ref 0–45)
BILIRUB SERPL-MCNC: 0.3 MG/DL
BUN SERPL-MCNC: 13 MG/DL (ref 8–23)
CALCIUM SERPL-MCNC: 9.1 MG/DL (ref 8.6–10)
CHLORIDE SERPL-SCNC: 99 MMOL/L (ref 98–107)
CHOLEST SERPL-MCNC: 192 MG/DL
CREAT SERPL-MCNC: 0.73 MG/DL (ref 0.67–1.17)
DEPRECATED HCO3 PLAS-SCNC: 26 MMOL/L (ref 22–29)
GFR SERPL CREATININE-BSD FRML MDRD: >90 ML/MIN/1.73M2
GLUCOSE SERPL-MCNC: 190 MG/DL (ref 70–99)
HBA1C MFR BLD: 7.6 % (ref 0–5.6)
HDLC SERPL-MCNC: 77 MG/DL
LDLC SERPL CALC-MCNC: 105 MG/DL
NONHDLC SERPL-MCNC: 115 MG/DL
POTASSIUM SERPL-SCNC: 4 MMOL/L (ref 3.4–5.3)
PROT SERPL-MCNC: 6.6 G/DL (ref 6.4–8.3)
PSA SERPL DL<=0.01 NG/ML-MCNC: 1.79 NG/ML (ref 0–3.5)
SODIUM SERPL-SCNC: 133 MMOL/L (ref 136–145)
TRIGL SERPL-MCNC: 52 MG/DL
TSH SERPL DL<=0.005 MIU/L-ACNC: 2.91 UIU/ML (ref 0.3–4.2)

## 2023-06-26 PROCEDURE — 84443 ASSAY THYROID STIM HORMONE: CPT | Performed by: FAMILY MEDICINE

## 2023-06-26 PROCEDURE — 99214 OFFICE O/P EST MOD 30 MIN: CPT | Performed by: FAMILY MEDICINE

## 2023-06-26 PROCEDURE — 36415 COLL VENOUS BLD VENIPUNCTURE: CPT | Performed by: FAMILY MEDICINE

## 2023-06-26 PROCEDURE — 83036 HEMOGLOBIN GLYCOSYLATED A1C: CPT | Performed by: FAMILY MEDICINE

## 2023-06-26 PROCEDURE — 80061 LIPID PANEL: CPT | Performed by: FAMILY MEDICINE

## 2023-06-26 PROCEDURE — 80053 COMPREHEN METABOLIC PANEL: CPT | Performed by: FAMILY MEDICINE

## 2023-06-26 PROCEDURE — G0103 PSA SCREENING: HCPCS | Performed by: FAMILY MEDICINE

## 2023-06-26 RX ORDER — LOSARTAN POTASSIUM 25 MG/1
25 TABLET ORAL DAILY
Qty: 90 TABLET | Refills: 1 | Status: SHIPPED | OUTPATIENT
Start: 2023-06-26 | End: 2024-02-16

## 2023-06-26 RX ORDER — INSULIN GLARGINE 100 [IU]/ML
INJECTION, SOLUTION SUBCUTANEOUS
Qty: 45 ML | Refills: 1 | Status: SHIPPED | OUTPATIENT
Start: 2023-06-26 | End: 2024-02-16

## 2023-06-26 RX ORDER — SILDENAFIL 100 MG/1
50-100 TABLET, FILM COATED ORAL DAILY PRN
Qty: 6 TABLET | Refills: 4 | Status: SHIPPED | OUTPATIENT
Start: 2023-06-26 | End: 2024-02-16

## 2023-06-26 ASSESSMENT — PAIN SCALES - GENERAL: PAINLEVEL: NO PAIN (0)

## 2023-06-26 NOTE — PATIENT INSTRUCTIONS
Start losartan 25 mg once daily    Call with problems/ side effects    Keep working on healthy diet/exercise     Other meds as is    We will send you lab results    See us in 6 months, sooner if needed

## 2023-06-26 NOTE — PROGRESS NOTES
"Krunal Simon is a 57 year old, presenting for the following health issues:  Diabetes (Fasting)        6/26/2023     9:02 AM   Additional Questions   Roomed by Erica Broderick     History of Present Illness       Reason for visit:  Diabetes chec    He eats 0-1 servings of fruits and vegetables daily.He consumes 2 sweetened beverage(s) daily.He exercises with enough effort to increase his heart rate 20 to 29 minutes per day.  He exercises with enough effort to increase his heart rate 5 days per week. He is missing 1 dose(s) of medications per week.          Review of Systems   After eating get tired    No naps    Sleeps well at night, 8 hours    Wake up rested    Feeling good overall    Active job    No exercise outside of work, not much time    No change in insulin dosing    Eating healthy          Objective    BP (!) 167/80 (BP Location: Right arm, Patient Position: Chair, Cuff Size: Adult Regular)   Pulse 55   Temp 97.4  F (36.3  C) (Temporal)   Resp 16   Ht 1.629 m (5' 4.14\")   Wt 65.4 kg (144 lb 4 oz)   SpO2 99%   BMI 24.65 kg/m    Body mass index is 24.65 kg/m .  Physical Exam  Constitutional:       Appearance: He is well-developed.   HENT:      Head: Normocephalic and atraumatic.   Eyes:      Conjunctiva/sclera: Conjunctivae normal.   Neck:      Vascular: No carotid bruit.   Cardiovascular:      Rate and Rhythm: Normal rate and regular rhythm.      Heart sounds: Normal heart sounds.   Pulmonary:      Effort: Pulmonary effort is normal. No respiratory distress.      Breath sounds: Normal breath sounds.   Abdominal:      Palpations: Abdomen is soft.      Tenderness: There is no abdominal tenderness.   Neurological:      Mental Status: He is alert and oriented to person, place, and time.      Cranial Nerves: No cranial nerve deficit.   Psychiatric:         Speech: Speech normal.         Behavior: Behavior normal.      osteoarthritis of hands    Patient states fell about two weeks ago on curb, " hurt left upper arm, denies bruising  Some tenderness over mis upper arm and prox left forearm tenderenss but no bruising, no point bony tenderness  Range of motion and strength okay    No edema    Radials symmetric        ASSESSMENT / PLAN:  (E11.319,  Z79.4) Type 2 diabetes mellitus with retinopathy without macular edema, with long-term current use of insulin, unspecified laterality, unspecified retinopathy severity (H)  (primary encounter diagnosis)  Comment: check labs, refill meds. 7.5 hemoglobin a1c last time I believe.   Plan: HEMOGLOBIN A1C, insulin aspart (NOVOLOG PEN)         100 UNIT/ML pen, insulin glargine (LANTUS         SOLOSTAR) 100 UNIT/ML pen, metFORMIN         (GLUCOPHAGE) 500 MG tablet             (I10) Hypertension goal BP (blood pressure) < 140/90  Comment: discussed in detail.  Prudent to start low dose arb.   Plan: losartan (COZAAR) 25 MG tablet             (N52.9) Erectile dysfunction, unspecified erectile dysfunction type  Comment: refill   Plan: sildenafil (VIAGRA) 100 MG tablet             (E78.5) Hyperlipidemia LDL goal <100  Comment: check labs.  On rosuv.   Plan: Lipid panel reflex to direct LDL Fasting,         Comprehensive metabolic panel             (Z12.5) Screening for prostate cancer  Comment: psa   Plan: Prostate Specific Antigen Screen             (R53.83) Fatigue, unspecified type  Comment: check   Plan: TSH with free T4 reflex             (S49.92XA) Injury of left upper arm, initial encounter  Comment:  Improving per patient.  Exam fairly reassuring.  If not resolving or if worsening, then see sports med.   Plan: Orthopedic  Referral                I reviewed the patient's medications, allergies, medical history, family history, and social history.    Davon Little MD

## 2023-06-27 NOTE — RESULT ENCOUNTER NOTE
Cholesterol is much better this time.    Diabetes test ( hemoglobin a1c ) still okay.    Other labs are fine.    Davon Little MD

## 2023-11-24 NOTE — PROGRESS NOTES
SUBJECTIVE:                                                    Alfred Koenig is a 51 year old male who presents to clinic today for the following health issues:    Musculoskeletal problem/pain      Duration: 4 days    Description  Location: Right knee    Intensity:  mild    Accompanying signs and symptoms: radiation of pain to calf    History  Previous similar problem: no   Previous evaluation:  none    Precipitating or alleviating factors:  Trauma or overuse: YES- Thinks that it was from using a jack-hammer at work  He was holding the hammer against his knee when lifting and this caused an abrasion   Aggravating factors include: none    Therapies tried and outcome: ice and NSAID - IBU    Current Outpatient Prescriptions   Medication Sig Dispense Refill     metFORMIN (GLUCOPHAGE) 1000 MG tablet TAKE 1 TABLET (1,000 MG) BY MOUTH 2 TIMES DAILY (WITH MEALS) 180 tablet 1     sildenafil (VIAGRA) 100 MG cap/tab Take 0.5-1 tablets ( mg) by mouth daily as needed for erectile dysfunction Take 30 min to 4 hours before intercourse.  Never use with nitroglycerin, terazosin or doxazosin. 6 tablet 3     rosuvastatin (CRESTOR) 40 MG tablet TAKE 1 TABLET (40 MG) BY MOUTH DAILY 90 tablet 2     glipiZIDE (GLUCOTROL) 10 MG tablet Take 1 tablet (10 mg) by mouth 2 times daily (before meals) 180 tablet 1     blood glucose monitoring (NO BRAND SPECIFIED) meter device kit Use to test blood sugar 3 times daily or as directed.  Needs new meter and then also include all  Needed strips, lancets, and other supplies, 3 months worth, refills for a year 1 kit 11     insulin glargine (LANTUS) 100 UNIT/ML injection 50 units at bedtime 3 Month 1     blood glucose monitoring (SHRADDHA MICROLET) lancets 1 each 3 times daily 1 Box 11     blood glucose monitoring (SHRADDHA CONTOUR NEXT) test strip Please note needs Contour NEXT  test stripsby In Vitro route 3 times daily. Please note needs Contour NEXT  test strips 100 each 11     blood glucose  Last office visit: 11/10/23 ()  Next office visit: NONE    gabapentin (NEURONTIN) 300 MG capsule 270 capsule 4 11/4/2022 --    Sig - Route: Take 1 capsule by mouth in the morning and 1 capsule at noon and 1 capsule in the evening. - Oral      No protocol, routing to provider     "monitoring (NO BRAND SPECIFIED) test strip Use to test blood sugar 3 times daily (Contour NEXT Test Strips). 3 Month 3     ASPIRIN 81 MG PO TABS ONE DAILY 100 Tab 3     insulin pen needle 31G X 8 MM 1 Device 2 times daily (Patient not taking: Reported on 7/18/2017) 100 each 11     pseudoePHEDrine (SUDAFED) 120 MG 12 hr tablet Take 1 tablet (120 mg) by mouth every 12 hours as needed for congestion (Patient not taking: Reported on 7/18/2017) 28 tablet 1     ACE/ARB NOT PRESCRIBED, INTENTIONAL, 1 each continuous prn. ACE & ARB not prescribed due to Other: blood pressure fine, no microalbuminuria       (Patient not taking: Reported on 7/18/2017)           Patient states no problems with diabetic control since getting this problem     O; /74  Pulse 83  Temp 98.1  F (36.7  C) (Oral)  Ht 5' 4.02\" (1.626 m)  Wt 142 lb (64.4 kg)  BMI 24.36 kg/m2      Patient is is NAD   Temp normal     Chest wall normal to inspection and palpation. Good excursion bilaterally. Lungs clear to auscultation. Good air movement bilaterally without rales, wheezes, or rhonchi.   Regular rate and  rhythm. S1 and S2 normal, no murmurs, clicks, gallops or rubs. No edema or JVD.    Right knee is red with an abrasion about the size of a 1/2 dollar   He has a central area with eschar,   Red and warm     Warmth extend down the right calf   No calf tenderness or cord   Negative Myles's sign         ICD-10-CM    1. Cellulitis and abscess of leg L03.119 cephALEXin (KEFLEX) 500 MG capsule    L02.419      Handout given   apply heat prn   Antibiotic   Watch blood sugars to make sure they are not going up with this infection   Return prn if worsening or calf starts to swell     "

## 2023-12-23 DIAGNOSIS — E11.319 TYPE 2 DIABETES MELLITUS WITH RETINOPATHY WITHOUT MACULAR EDEMA, WITH LONG-TERM CURRENT USE OF INSULIN, UNSPECIFIED LATERALITY, UNSPECIFIED RETINOPATHY SEVERITY (H): ICD-10-CM

## 2023-12-23 DIAGNOSIS — Z79.4 TYPE 2 DIABETES MELLITUS WITH RETINOPATHY WITHOUT MACULAR EDEMA, WITH LONG-TERM CURRENT USE OF INSULIN, UNSPECIFIED LATERALITY, UNSPECIFIED RETINOPATHY SEVERITY (H): ICD-10-CM

## 2024-02-11 ENCOUNTER — HEALTH MAINTENANCE LETTER (OUTPATIENT)
Age: 59
End: 2024-02-11

## 2024-02-16 ENCOUNTER — OFFICE VISIT (OUTPATIENT)
Dept: FAMILY MEDICINE | Facility: CLINIC | Age: 59
End: 2024-02-16
Payer: COMMERCIAL

## 2024-02-16 VITALS
OXYGEN SATURATION: 100 % | HEART RATE: 56 BPM | RESPIRATION RATE: 18 BRPM | DIASTOLIC BLOOD PRESSURE: 78 MMHG | WEIGHT: 147 LBS | TEMPERATURE: 97 F | HEIGHT: 64 IN | BODY MASS INDEX: 25.1 KG/M2 | SYSTOLIC BLOOD PRESSURE: 139 MMHG

## 2024-02-16 DIAGNOSIS — E78.5 DYSLIPIDEMIA: ICD-10-CM

## 2024-02-16 DIAGNOSIS — Z79.4 TYPE 2 DIABETES MELLITUS WITH RETINOPATHY WITHOUT MACULAR EDEMA, WITH LONG-TERM CURRENT USE OF INSULIN, UNSPECIFIED LATERALITY, UNSPECIFIED RETINOPATHY SEVERITY (H): Primary | ICD-10-CM

## 2024-02-16 DIAGNOSIS — E78.5 HYPERLIPIDEMIA LDL GOAL <100: ICD-10-CM

## 2024-02-16 DIAGNOSIS — N52.9 ERECTILE DYSFUNCTION, UNSPECIFIED ERECTILE DYSFUNCTION TYPE: ICD-10-CM

## 2024-02-16 DIAGNOSIS — E11.319 TYPE 2 DIABETES MELLITUS WITH RETINOPATHY WITHOUT MACULAR EDEMA, WITH LONG-TERM CURRENT USE OF INSULIN, UNSPECIFIED LATERALITY, UNSPECIFIED RETINOPATHY SEVERITY (H): Primary | ICD-10-CM

## 2024-02-16 DIAGNOSIS — I10 HYPERTENSION GOAL BP (BLOOD PRESSURE) < 140/90: ICD-10-CM

## 2024-02-16 LAB
ALBUMIN SERPL BCG-MCNC: 4.6 G/DL (ref 3.5–5.2)
ALP SERPL-CCNC: 64 U/L (ref 40–150)
ALT SERPL W P-5'-P-CCNC: 28 U/L (ref 0–70)
ANION GAP SERPL CALCULATED.3IONS-SCNC: 9 MMOL/L (ref 7–15)
AST SERPL W P-5'-P-CCNC: 38 U/L (ref 0–45)
BILIRUB SERPL-MCNC: 0.6 MG/DL
BUN SERPL-MCNC: 18.3 MG/DL (ref 8–23)
CALCIUM SERPL-MCNC: 9.9 MG/DL (ref 8.8–10.2)
CHLORIDE SERPL-SCNC: 101 MMOL/L (ref 98–107)
CHOLEST SERPL-MCNC: 256 MG/DL
CREAT SERPL-MCNC: 0.89 MG/DL (ref 0.67–1.17)
CREAT UR-MCNC: 20.4 MG/DL
DEPRECATED HCO3 PLAS-SCNC: 29 MMOL/L (ref 22–29)
EGFRCR SERPLBLD CKD-EPI 2021: >90 ML/MIN/1.73M2
FASTING STATUS PATIENT QL REPORTED: YES
GLUCOSE SERPL-MCNC: 91 MG/DL (ref 70–99)
HBA1C MFR BLD: 7.5 % (ref 0–5.6)
HDLC SERPL-MCNC: 70 MG/DL
LDLC SERPL CALC-MCNC: 176 MG/DL
MICROALBUMIN UR-MCNC: <12 MG/L
MICROALBUMIN/CREAT UR: NORMAL MG/G{CREAT}
NONHDLC SERPL-MCNC: 186 MG/DL
POTASSIUM SERPL-SCNC: 4.5 MMOL/L (ref 3.4–5.3)
PROT SERPL-MCNC: 7.7 G/DL (ref 6.4–8.3)
SODIUM SERPL-SCNC: 139 MMOL/L (ref 135–145)
TRIGL SERPL-MCNC: 49 MG/DL

## 2024-02-16 PROCEDURE — 99214 OFFICE O/P EST MOD 30 MIN: CPT | Performed by: FAMILY MEDICINE

## 2024-02-16 PROCEDURE — 36415 COLL VENOUS BLD VENIPUNCTURE: CPT | Performed by: FAMILY MEDICINE

## 2024-02-16 PROCEDURE — 80061 LIPID PANEL: CPT | Performed by: FAMILY MEDICINE

## 2024-02-16 PROCEDURE — 80053 COMPREHEN METABOLIC PANEL: CPT | Performed by: FAMILY MEDICINE

## 2024-02-16 PROCEDURE — 82570 ASSAY OF URINE CREATININE: CPT | Performed by: FAMILY MEDICINE

## 2024-02-16 PROCEDURE — 99207 PR FOOT EXAM NO CHARGE: CPT | Performed by: FAMILY MEDICINE

## 2024-02-16 PROCEDURE — 82043 UR ALBUMIN QUANTITATIVE: CPT | Performed by: FAMILY MEDICINE

## 2024-02-16 PROCEDURE — 83036 HEMOGLOBIN GLYCOSYLATED A1C: CPT | Performed by: FAMILY MEDICINE

## 2024-02-16 RX ORDER — ROSUVASTATIN CALCIUM 5 MG/1
5 TABLET, COATED ORAL AT BEDTIME
Qty: 90 TABLET | Refills: 3 | Status: SHIPPED | OUTPATIENT
Start: 2024-02-16

## 2024-02-16 RX ORDER — INSULIN GLARGINE 100 [IU]/ML
INJECTION, SOLUTION SUBCUTANEOUS
Qty: 45 ML | Refills: 1 | Status: SHIPPED | OUTPATIENT
Start: 2024-02-16

## 2024-02-16 RX ORDER — LOSARTAN POTASSIUM 25 MG/1
25 TABLET ORAL DAILY
Qty: 90 TABLET | Refills: 1 | Status: SHIPPED | OUTPATIENT
Start: 2024-02-16

## 2024-02-16 RX ORDER — SILDENAFIL 100 MG/1
50-100 TABLET, FILM COATED ORAL DAILY PRN
Qty: 6 TABLET | Refills: 4 | Status: SHIPPED | OUTPATIENT
Start: 2024-02-16

## 2024-02-16 ASSESSMENT — PAIN SCALES - GENERAL: PAINLEVEL: NO PAIN (0)

## 2024-02-16 NOTE — PATIENT INSTRUCTIONS
We will send you lab results    Advise  restarting cholesterol pill rosuvastatin daily    Start blood pressure medication losartan  daily    Continue other meds    Keep working on healthy diet/exercise

## 2024-02-16 NOTE — PROGRESS NOTES
"Krunal Simon is a 58 year old, presenting for the following health issues:  Diabetes (Fasting)        2/16/2024     8:25 AM   Additional Questions   Roomed by Erica Broderick     Via the Health Maintenance questionnaire, the patient has reported the following services have been completed -Eye Exam, this information has been sent to the abstraction team.  History of Present Illness       Diabetes:   He presents for follow up of diabetes.  He is checking home blood glucose four or more times daily.   He checks blood glucose before and after meals.  Blood glucose is sometimes over 200 and sometimes under 70. He is aware of hypoglycemia symptoms including shakiness.   He is concerned about blood sugar frequently over 200.    He is not experiencing numbness or burning in feet, excessive thirst, blurry vision, weight changes or redness, sores or blisters on feet. The patient has had a diabetic eye exam in the last 12 months. Eye exam performed on 03/01/2023. Location of last eye exam [place].        He eats 2-3 servings of fruits and vegetables daily.He consumes 0 sweetened beverage(s) daily.He exercises with enough effort to increase his heart rate 30 to 60 minutes per day.  He exercises with enough effort to increase his heart rate 5 days per week.   He is taking medications regularly.       Feeling  good    No new problems      Run daily for one hour    Eating healthy    About 18 units glargine    5 units aspart with meals, varies            Objective    BP (!) 151/79 (BP Location: Left arm, Patient Position: Chair, Cuff Size: Adult Regular)   Pulse 56   Temp 97  F (36.1  C) (Temporal)   Resp 18   Ht 1.629 m (5' 4.14\")   Wt 66.7 kg (147 lb)   SpO2 100%   BMI 25.12 kg/m    Body mass index is 25.12 kg/m .  Physical Exam  Constitutional:       Appearance: He is well-developed.   HENT:      Head: Normocephalic and atraumatic.   Eyes:      Conjunctiva/sclera: Conjunctivae normal.   Neck:      Vascular: No " carotid bruit.   Cardiovascular:      Rate and Rhythm: Normal rate and regular rhythm.      Heart sounds: Normal heart sounds.   Pulmonary:      Effort: Pulmonary effort is normal. No respiratory distress.      Breath sounds: Normal breath sounds.   Neurological:      Mental Status: He is alert and oriented to person, place, and time.      Cranial Nerves: No cranial nerve deficit.   Psychiatric:         Speech: Speech normal.         Behavior: Behavior normal.      No edema    Radials symmetric     Foot exam normal bilat      ASSESSMENT / PLAN:  (E11.319,  Z79.4) Type 2 diabetes mellitus with retinopathy without macular edema, with long-term current use of insulin, unspecified laterality, unspecified retinopathy severity (H)  (primary encounter diagnosis)  Comment: check labs, refill meds   Plan: Albumin Random Urine Quantitative with Creat         Ratio, HEMOGLOBIN A1C, FOOT EXAM, insulin         glargine (LANTUS SOLOSTAR) 100 UNIT/ML pen,         insulin aspart (NOVOLOG PEN) 100 UNIT/ML pen,         Continuous Blood Gluc Sensor (FREESTYLE GLO 2        SENSOR) [facility] [place], [place], Adult Eye  Referral             (E78.5) Hyperlipidemia LDL goal <100  Comment: of note patient has apparently not been taking the atorv  Plan: Lipid panel reflex to direct LDL Fasting,         Comprehensive metabolic panel        Recheck labs, urged patient to restart med    (I10) Hypertension goal BP (blood pressure) < 140/90  Comment: blood pressure not ideal; apparently patient has not been on the losartan. Urged patient to start this.   Plan: losartan (COZAAR) 25 MG tablet             (N52.9) Erectile dysfunction, unspecified erectile dysfunction type  Comment: needs refill   Plan: sildenafil (VIAGRA) 100 MG tablet             (E78.5) Dyslipidemia  Comment: refill   Plan: rosuvastatin (CRESTOR) 5 MG tablet               I reviewed the patient's medications, allergies, medical history, family history, and social history.    Davon Little  MD                  Signed Electronically by: Davon Little MD

## 2024-02-17 NOTE — RESULT ENCOUNTER NOTE
Cholesterol is high.  Restart the atorvastatin.    Diabetes test ( hemoglobin a1c ) is stable.    Other labs okay.    Davon Little MD

## 2024-06-29 ENCOUNTER — HEALTH MAINTENANCE LETTER (OUTPATIENT)
Age: 59
End: 2024-06-29

## 2024-07-05 DIAGNOSIS — Z79.4 TYPE 2 DIABETES MELLITUS WITH RETINOPATHY WITHOUT MACULAR EDEMA, WITH LONG-TERM CURRENT USE OF INSULIN, UNSPECIFIED LATERALITY, UNSPECIFIED RETINOPATHY SEVERITY (H): ICD-10-CM

## 2024-07-05 DIAGNOSIS — E11.319 TYPE 2 DIABETES MELLITUS WITH RETINOPATHY WITHOUT MACULAR EDEMA, WITH LONG-TERM CURRENT USE OF INSULIN, UNSPECIFIED LATERALITY, UNSPECIFIED RETINOPATHY SEVERITY (H): ICD-10-CM

## 2024-07-06 RX ORDER — PEN NEEDLE, DIABETIC 31 GX5/16"
NEEDLE, DISPOSABLE MISCELLANEOUS
Qty: 200 EACH | Refills: 5 | Status: SHIPPED | OUTPATIENT
Start: 2024-07-06

## 2024-09-07 ENCOUNTER — HEALTH MAINTENANCE LETTER (OUTPATIENT)
Age: 59
End: 2024-09-07

## 2024-09-24 DIAGNOSIS — Z79.4 TYPE 2 DIABETES MELLITUS WITH RETINOPATHY WITHOUT MACULAR EDEMA, WITH LONG-TERM CURRENT USE OF INSULIN, UNSPECIFIED LATERALITY, UNSPECIFIED RETINOPATHY SEVERITY (H): ICD-10-CM

## 2024-09-24 DIAGNOSIS — E11.319 TYPE 2 DIABETES MELLITUS WITH RETINOPATHY WITHOUT MACULAR EDEMA, WITH LONG-TERM CURRENT USE OF INSULIN, UNSPECIFIED LATERALITY, UNSPECIFIED RETINOPATHY SEVERITY (H): ICD-10-CM

## 2025-01-13 ENCOUNTER — TRANSFERRED RECORDS (OUTPATIENT)
Dept: MULTI SPECIALTY CLINIC | Facility: CLINIC | Age: 60
End: 2025-01-13

## 2025-01-13 LAB — RETINOPATHY: NORMAL

## 2025-02-13 ENCOUNTER — OFFICE VISIT (OUTPATIENT)
Dept: FAMILY MEDICINE | Facility: CLINIC | Age: 60
End: 2025-02-13
Payer: COMMERCIAL

## 2025-02-13 VITALS
DIASTOLIC BLOOD PRESSURE: 68 MMHG | WEIGHT: 140 LBS | BODY MASS INDEX: 23.9 KG/M2 | SYSTOLIC BLOOD PRESSURE: 135 MMHG | TEMPERATURE: 97 F | HEIGHT: 64 IN | HEART RATE: 61 BPM | OXYGEN SATURATION: 99 % | RESPIRATION RATE: 16 BRPM

## 2025-02-13 DIAGNOSIS — Z79.4 TYPE 2 DIABETES MELLITUS WITH RETINOPATHY WITHOUT MACULAR EDEMA, WITH LONG-TERM CURRENT USE OF INSULIN, UNSPECIFIED LATERALITY, UNSPECIFIED RETINOPATHY SEVERITY (H): ICD-10-CM

## 2025-02-13 DIAGNOSIS — Z00.00 ROUTINE GENERAL MEDICAL EXAMINATION AT A HEALTH CARE FACILITY: Primary | ICD-10-CM

## 2025-02-13 DIAGNOSIS — E11.319 TYPE 2 DIABETES MELLITUS WITH RETINOPATHY WITHOUT MACULAR EDEMA, WITH LONG-TERM CURRENT USE OF INSULIN, UNSPECIFIED LATERALITY, UNSPECIFIED RETINOPATHY SEVERITY (H): ICD-10-CM

## 2025-02-13 DIAGNOSIS — E78.5 DYSLIPIDEMIA: ICD-10-CM

## 2025-02-13 DIAGNOSIS — I10 HYPERTENSION GOAL BP (BLOOD PRESSURE) < 140/90: ICD-10-CM

## 2025-02-13 DIAGNOSIS — R53.83 FATIGUE, UNSPECIFIED TYPE: ICD-10-CM

## 2025-02-13 DIAGNOSIS — Z12.5 SCREENING FOR PROSTATE CANCER: ICD-10-CM

## 2025-02-13 DIAGNOSIS — Z12.11 SCREEN FOR COLON CANCER: ICD-10-CM

## 2025-02-13 DIAGNOSIS — Z86.0100 HISTORY OF COLONIC POLYPS: ICD-10-CM

## 2025-02-13 DIAGNOSIS — N52.9 ERECTILE DYSFUNCTION, UNSPECIFIED ERECTILE DYSFUNCTION TYPE: ICD-10-CM

## 2025-02-13 LAB
ALBUMIN SERPL BCG-MCNC: 4.5 G/DL (ref 3.5–5.2)
ALP SERPL-CCNC: 69 U/L (ref 40–150)
ALT SERPL W P-5'-P-CCNC: 32 U/L (ref 0–70)
ANION GAP SERPL CALCULATED.3IONS-SCNC: 13 MMOL/L (ref 7–15)
AST SERPL W P-5'-P-CCNC: 39 U/L (ref 0–45)
BASOPHILS # BLD AUTO: 0.1 10E3/UL (ref 0–0.2)
BASOPHILS NFR BLD AUTO: 1 %
BILIRUB SERPL-MCNC: 0.5 MG/DL
BUN SERPL-MCNC: 10.9 MG/DL (ref 8–23)
CALCIUM SERPL-MCNC: 9.9 MG/DL (ref 8.8–10.4)
CHLORIDE SERPL-SCNC: 99 MMOL/L (ref 98–107)
CHOLEST SERPL-MCNC: 232 MG/DL
CREAT SERPL-MCNC: 1.02 MG/DL (ref 0.67–1.17)
CREAT UR-MCNC: 51.3 MG/DL
EGFRCR SERPLBLD CKD-EPI 2021: 84 ML/MIN/1.73M2
EOSINOPHIL # BLD AUTO: 0.3 10E3/UL (ref 0–0.7)
EOSINOPHIL NFR BLD AUTO: 6 %
ERYTHROCYTE [DISTWIDTH] IN BLOOD BY AUTOMATED COUNT: 11.8 % (ref 10–15)
EST. AVERAGE GLUCOSE BLD GHB EST-MCNC: 171 MG/DL
FASTING STATUS PATIENT QL REPORTED: YES
FASTING STATUS PATIENT QL REPORTED: YES
GLUCOSE SERPL-MCNC: 156 MG/DL (ref 70–99)
HBA1C MFR BLD: 7.6 % (ref 0–5.6)
HCO3 SERPL-SCNC: 25 MMOL/L (ref 22–29)
HCT VFR BLD AUTO: 44.6 % (ref 40–53)
HDLC SERPL-MCNC: 77 MG/DL
HGB BLD-MCNC: 14.9 G/DL (ref 13.3–17.7)
IMM GRANULOCYTES # BLD: 0 10E3/UL
IMM GRANULOCYTES NFR BLD: 0 %
LDLC SERPL CALC-MCNC: 142 MG/DL
LYMPHOCYTES # BLD AUTO: 2.1 10E3/UL (ref 0.8–5.3)
LYMPHOCYTES NFR BLD AUTO: 44 %
MCH RBC QN AUTO: 31.3 PG (ref 26.5–33)
MCHC RBC AUTO-ENTMCNC: 33.4 G/DL (ref 31.5–36.5)
MCV RBC AUTO: 94 FL (ref 78–100)
MICROALBUMIN UR-MCNC: <12 MG/L
MICROALBUMIN/CREAT UR: NORMAL MG/G{CREAT}
MONOCYTES # BLD AUTO: 0.4 10E3/UL (ref 0–1.3)
MONOCYTES NFR BLD AUTO: 9 %
NEUTROPHILS # BLD AUTO: 1.9 10E3/UL (ref 1.6–8.3)
NEUTROPHILS NFR BLD AUTO: 39 %
NONHDLC SERPL-MCNC: 155 MG/DL
PLATELET # BLD AUTO: 241 10E3/UL (ref 150–450)
POTASSIUM SERPL-SCNC: 4.8 MMOL/L (ref 3.4–5.3)
PROT SERPL-MCNC: 7.3 G/DL (ref 6.4–8.3)
PSA SERPL DL<=0.01 NG/ML-MCNC: 1.96 NG/ML (ref 0–4.5)
RBC # BLD AUTO: 4.76 10E6/UL (ref 4.4–5.9)
SODIUM SERPL-SCNC: 137 MMOL/L (ref 135–145)
TRIGL SERPL-MCNC: 67 MG/DL
TSH SERPL DL<=0.005 MIU/L-ACNC: 2.17 UIU/ML (ref 0.3–4.2)
WBC # BLD AUTO: 4.8 10E3/UL (ref 4–11)

## 2025-02-13 RX ORDER — ROSUVASTATIN CALCIUM 5 MG/1
5 TABLET, COATED ORAL AT BEDTIME
Qty: 90 TABLET | Refills: 3 | Status: SHIPPED | OUTPATIENT
Start: 2025-02-13

## 2025-02-13 RX ORDER — PROCHLORPERAZINE 25 MG/1
SUPPOSITORY RECTAL
Qty: 3 EACH | Refills: 11 | Status: SHIPPED | OUTPATIENT
Start: 2025-02-13

## 2025-02-13 RX ORDER — SILDENAFIL 100 MG/1
50-100 TABLET, FILM COATED ORAL DAILY PRN
Qty: 6 TABLET | Refills: 4 | Status: SHIPPED | OUTPATIENT
Start: 2025-02-13

## 2025-02-13 RX ORDER — INSULIN GLARGINE 100 [IU]/ML
INJECTION, SOLUTION SUBCUTANEOUS
Qty: 45 ML | Refills: 1 | Status: SHIPPED | OUTPATIENT
Start: 2025-02-13

## 2025-02-13 RX ORDER — LOSARTAN POTASSIUM 25 MG/1
25 TABLET ORAL DAILY
Qty: 90 TABLET | Refills: 3 | Status: SHIPPED | OUTPATIENT
Start: 2025-02-13

## 2025-02-13 SDOH — HEALTH STABILITY: PHYSICAL HEALTH: ON AVERAGE, HOW MANY DAYS PER WEEK DO YOU ENGAGE IN MODERATE TO STRENUOUS EXERCISE (LIKE A BRISK WALK)?: 5 DAYS

## 2025-02-13 SDOH — HEALTH STABILITY: PHYSICAL HEALTH: ON AVERAGE, HOW MANY MINUTES DO YOU ENGAGE IN EXERCISE AT THIS LEVEL?: 60 MIN

## 2025-02-13 ASSESSMENT — SOCIAL DETERMINANTS OF HEALTH (SDOH): HOW OFTEN DO YOU GET TOGETHER WITH FRIENDS OR RELATIVES?: ONCE A WEEK

## 2025-02-13 ASSESSMENT — PAIN SCALES - GENERAL: PAINLEVEL_OUTOF10: NO PAIN (0)

## 2025-02-13 NOTE — PROGRESS NOTES
Preventive Care Visit  [facility]  Feb 13, 2025      Assessment & Plan     (Z00.00) Routine general medical examination at a health care facility  (primary encounter diagnosis)  Comment: overall patient stable   Plan: keep working on healthy diet/exercise     (E11.319,  Z79.4) Type 2 diabetes mellitus with retinopathy without macular edema, with long-term current use of insulin, unspecified laterality, unspecified retinopathy severity (H)  Comment: check labs, refill meds.  Currently not using the short acting insulin  Plan: HEMOGLOBIN A1C, Lipid panel reflex to direct         LDL Fasting, Albumin Random Urine Quantitative         with Creat Ratio, Continuous Glucose Sensor         (DEXCOM G6 SENSOR) MISC, insulin glargine         (LANTUS SOLOSTAR) 100 UNIT/ML pen, metFORMIN         (GLUCOPHAGE) 500 MG tablet, TSH with free T4         reflex             (Z12.11) Screen for colon cancer  Comment: patient to schedule colonoscopy   Plan: as above     (I10) Hypertension goal BP (blood pressure) < 140/90  Comment: blood pressure variable, good to start this ( he apparently did not start it after we sent in prescription last year )  Plan: losartan (COZAAR) 25 MG tablet             (E78.5) Dyslipidemia  Comment: refill med   Plan: rosuvastatin (CRESTOR) 5 MG tablet             (N52.9) Erectile dysfunction, unspecified erectile dysfunction type  Comment: refill   Plan: sildenafil (VIAGRA) 100 MG tablet             (R53.83) Fatigue, unspecified type  Comment: check   Plan: CBC with Platelets & Differential,         Comprehensive metabolic panel             (Z86.0100) History of colonic polyps  Comment: patient to schedule   Plan: Colonoscopy Screening  Referral             (Z12.5) Screening for prostate cancer  Comment: psa   Plan: Prostate Specific Antigen Screen                     Counseling  Appropriate preventive services were addressed with this  patient via screening, questionnaire, or discussion as appropriate for fall prevention, nutrition, physical activity, Tobacco-use cessation, social engagement, weight loss and cognition.  Checklist reviewing preventive services available has been given to the patient.  Reviewed patient's diet, addressing concerns and/or questions.            Krunal Simon is a 59 year old, presenting for the following:  Physical (Fasting)        2/13/2025     7:52 AM   Additional Questions   Roomed by Erica Broderick        Via the Health Maintenance questionnaire, the patient has reported the following services have been completed -Eye Exam: [facility] 2025-01-13, this information has been sent to the abstraction team.    HPI    Not checking  blood pressure at home / drugstore    Feeling good      Health Care Directive  Patient does not have a Health Care Directive: Discussed advance care planning with patient; however, patient declined at this time.      2/13/2025   General Health   How would you rate your overall physical health? Good   Feel stress (tense, anxious, or unable to sleep) Not at all         2/13/2025   Nutrition   Three or more servings of calcium each day? Yes   Diet: Regular (no restrictions)    Diabetic    Carbohydrate counting   How many servings of fruit and vegetables per day? (!) 0-1   How many sweetened beverages each day? 0-1       Multiple values from one day are sorted in reverse-chronological order         2/13/2025   Exercise   Days per week of moderate/strenous exercise 5 days   Average minutes spent exercising at this level 60 min         2/13/2025   Social Factors   Frequency of gathering with friends or relatives Once a week   Worry food won't last until get money to buy more No   Food not last or not have enough money for food? No   Do you have housing? (Housing is defined as stable permanent housing and does not include staying ouside in a car, in a tent, in an abandoned building, in an  overnight shelter, or couch-surfing.) No   Are you worried about losing your housing? No   Lack of transportation? Patient declined   Unable to get utilities (heat,electricity)? No   Want help with housing or utility concern? No   (!) HOUSING CONCERN PRESENT      2/13/2025   Fall Risk   Fallen 2 or more times in the past year? No   Trouble with walking or balance? No          2/13/2025   Dental   Dentist two times every year? Yes            Today's PHQ-2 Score:       2/13/2025     7:37 AM   PHQ-2 ( 1999 [employer])   Q1: Little interest or pleasure in doing things 0   Q2: Feeling down, depressed or hopeless 0   PHQ-2 Score 0    Q1: Little interest or pleasure in doing things Not at all   Q2: Feeling down, depressed or hopeless Not at all   PHQ-2 Score 0       Patient-reported           2/13/2025   Substance Use   Alcohol more than 3/day or more than 7/wk No   Do you use any other substances recreationally? No     Social History     Tobacco Use    Smoking status: Never     Passive exposure: Never    Smokeless tobacco: Never   Vaping Use    Vaping status: Never Used   Substance Use Topics    Alcohol use: Yes     Alcohol/week: 0.0 standard drinks of alcohol     Comment: 6 beers per week    Drug use: No             2/13/2025   One time HIV Screening   Previous HIV test? No         2/13/2025   STI Screening   New sexual partner(s) since last STI/HIV test? No   Last PSA:   PSA   Date Value Ref Range Status   01/14/2021 1.89 0 - 4 ug/L Final     Comment:     Assay Method:  Chemiluminescence using Siemens Vista analyzer     Prostate Specific Antigen Screen   Date Value Ref Range Status   06/26/2023 1.79 0.00 - 3.50 ng/mL Final   01/11/2022 1.77 0.00 - 4.00 ug/L Final     ASCVD Risk   The 10-year ASCVD risk score (Doreen SHARP, et al., 2019) is: 31%    Values used to calculate the score:      Age: 59 years      Sex: Male      Is Non- : No      Diabetic: Yes      Tobacco smoker: No      Systolic  "Blood Pressure: 172 mmHg      Is BP treated: Yes      HDL Cholesterol: 70 mg/dL      Total Cholesterol: 256 mg/dL           Reviewed and updated as needed this visit by Provider                   10 units lantus bedtime    Not  needing short acting  insulin    140-150 avg     No  chest  pain / breathing problems    Walk 5 miles  daily     Walk  bid, with dog    Saw eye doctor about a month ago    Vision  okay    Has some retinopathy         Objective    Exam  BP (!) 172/75 (BP Location: Right arm, Patient Position: Chair, Cuff Size: Adult Regular)   Pulse 61   Temp 97  F (36.1  C) (Temporal)   Resp 16   Ht 1.628 m (5' 4.1\")   Wt 63.5 kg (140 lb)   SpO2 99%   BMI 23.96 kg/m     Estimated body mass index is 23.96 kg/m  as calculated from the following:    Height as of this encounter: 1.628 m (5' 4.1\").    Weight as of this encounter: 63.5 kg (140 lb).    Physical Exam  GENERAL: alert and no distress  EYES: Eyes grossly normal to inspection, PERRL and conjunctivae and sclerae normal  HENT: ear canals and TM's normal, nose and mouth without ulcers or lesions  NECK: no adenopathy, no asymmetry, masses, or scars  RESP: lungs clear to auscultation - no rales, rhonchi or wheezes  CV: regular rate and rhythm, normal S1 S2, no S3 or S4, no murmur, click or rub, no peripheral edema  ABDOMEN: soft, nontender, no hepatosplenomegaly, no masses and bowel sounds normal  MS: no gross musculoskeletal defects noted, no edema  SKIN: no suspicious lesions or rashes  NEURO: Normal strength and tone, mentation intact and speech normal  PSYCH: mentation appears normal, affect normal/bright  Foot exam normal bilaterally            Signed Electronically by: Davon iLttle MD    "

## 2025-02-13 NOTE — PATIENT INSTRUCTIONS
We will send you lab results    If we need to adjust meds based on labs we will let you know    Keep working on healthy diet/exercise     Start losartan once daily     Continue other meds    Schedule  colonoscopy                 Patient Education   Preventive Care Advice   This is general advice given by our system to help you stay healthy. However, your care team may have specific advice just for you. Please talk to your care team about your preventive care needs.  Nutrition  Eat 5 or more servings of fruits and vegetables each day.  Try wheat bread, brown rice and whole grain pasta (instead of white bread, rice, and pasta).  Get enough calcium and vitamin D. Check the label on foods and aim for 100% of the RDA (recommended daily allowance).  Lifestyle  Exercise at least 150 minutes each week  (30 minutes a day, 5 days a week).  Do muscle strengthening activities 2 days a week. These help control your weight and prevent disease.  No smoking.  Wear sunscreen to prevent skin cancer.  Have a dental exam and cleaning every 6 months.  Yearly exams  See your health care team every year to talk about:  Any changes in your health.  Any medicines your care team has prescribed.  Preventive care, family planning, and ways to prevent chronic diseases.  Shots (vaccines)   HPV shots (up to age 26), if you've never had them before.  Hepatitis B shots (up to age 59), if you've never had them before.  COVID-19 shot: Get this shot when it's due.  Flu shot: Get a flu shot every year.  Tetanus shot: Get a tetanus shot every 10 years.  Pneumococcal, hepatitis A, and RSV shots: Ask your care team if you need these based on your risk.  Shingles shot (for age 50 and up)  General health tests  Diabetes screening:  Starting at age 35, Get screened for diabetes at least every 3 years.  If you are younger than age 35, ask your care team if you should be screened for diabetes.  Cholesterol test: At age 39, start having a cholesterol test every  5 years, or more often if advised.  Bone density scan (DEXA): At age 50, ask your care team if you should have this scan for osteoporosis (brittle bones).  Hepatitis C: Get tested at least once in your life.  STIs (sexually transmitted infections)  Before age 24: Ask your care team if you should be screened for STIs.  After age 24: Get screened for STIs if you're at risk. You are at risk for STIs (including HIV) if:  You are sexually active with more than one person.  You don't use condoms every time.  You or a partner was diagnosed with a sexually transmitted infection.  If you are at risk for HIV, ask about PrEP medicine to prevent HIV.  Get tested for HIV at least once in your life, whether you are at risk for HIV or not.  Cancer screening tests  Cervical cancer screening: If you have a cervix, begin getting regular cervical cancer screening tests starting at age 21.  Breast cancer scan (mammogram): If you've ever had breasts, begin having regular mammograms starting at age 40. This is a scan to check for breast cancer.  Colon cancer screening: It is important to start screening for colon cancer at age 45.  Have a colonoscopy test every 10 years (or more often if you're at risk) Or, ask your provider about stool tests like a FIT test every year or Cologuard test every 3 years.  To learn more about your testing options, visit:   .  For help making a decision, visit:   https://bit.ly/mb72766.  Prostate cancer screening test: If you have a prostate, ask your care team if a prostate cancer screening test (PSA) at age 55 is right for you.  Lung cancer screening: If you are a current or former smoker ages 50 to 80, ask your care team if ongoing lung cancer screenings are right for you.  For informational purposes only. Not to replace the advice of your health care provider. Copyright   2023 [employer]. All rights reserved. Clinically reviewed by the [facility]. [employer] [id number]  - REV 01/24.

## 2025-06-03 DIAGNOSIS — E11.319 TYPE 2 DIABETES MELLITUS WITH RETINOPATHY WITHOUT MACULAR EDEMA, WITH LONG-TERM CURRENT USE OF INSULIN, UNSPECIFIED LATERALITY, UNSPECIFIED RETINOPATHY SEVERITY (H): ICD-10-CM

## 2025-06-03 DIAGNOSIS — Z79.4 TYPE 2 DIABETES MELLITUS WITH RETINOPATHY WITHOUT MACULAR EDEMA, WITH LONG-TERM CURRENT USE OF INSULIN, UNSPECIFIED LATERALITY, UNSPECIFIED RETINOPATHY SEVERITY (H): ICD-10-CM

## 2025-06-04 RX ORDER — INSULIN GLARGINE-YFGN 100 [IU]/ML
INJECTION, SOLUTION SUBCUTANEOUS
Refills: 1 | OUTPATIENT
Start: 2025-06-04

## 2025-08-03 PROBLEM — E11.3299: Status: RESOLVED | Noted: 2017-03-28 | Resolved: 2025-08-03

## 2025-08-24 ENCOUNTER — HEALTH MAINTENANCE LETTER (OUTPATIENT)
Age: 60
End: 2025-08-24

## (undated) RX ORDER — SIMETHICONE 40MG/0.6ML
SUSPENSION, DROPS(FINAL DOSAGE FORM)(ML) ORAL
Status: DISPENSED
Start: 2020-01-13

## (undated) RX ORDER — FENTANYL CITRATE 50 UG/ML
INJECTION, SOLUTION INTRAMUSCULAR; INTRAVENOUS
Status: DISPENSED
Start: 2020-01-13